# Patient Record
Sex: MALE | Race: BLACK OR AFRICAN AMERICAN | NOT HISPANIC OR LATINO | URBAN - METROPOLITAN AREA
[De-identification: names, ages, dates, MRNs, and addresses within clinical notes are randomized per-mention and may not be internally consistent; named-entity substitution may affect disease eponyms.]

---

## 2018-07-02 ENCOUNTER — INPATIENT (INPATIENT)
Facility: HOSPITAL | Age: 28
LOS: 2 days | Discharge: HOME | End: 2018-07-05
Attending: HOSPITALIST | Admitting: HOSPITALIST
Payer: MEDICAID

## 2018-07-02 VITALS
HEART RATE: 108 BPM | OXYGEN SATURATION: 97 % | DIASTOLIC BLOOD PRESSURE: 93 MMHG | TEMPERATURE: 99 F | RESPIRATION RATE: 16 BRPM | SYSTOLIC BLOOD PRESSURE: 130 MMHG

## 2018-07-02 LAB
ALBUMIN SERPL ELPH-MCNC: 3.7 G/DL — SIGNIFICANT CHANGE UP (ref 3.3–5)
ALBUMIN SERPL ELPH-MCNC: 5.4 G/DL — HIGH (ref 3.3–5)
ALP SERPL-CCNC: 57 U/L — SIGNIFICANT CHANGE UP (ref 40–120)
ALP SERPL-CCNC: 79 U/L — SIGNIFICANT CHANGE UP (ref 40–120)
ALT FLD-CCNC: 11 U/L — SIGNIFICANT CHANGE UP (ref 4–41)
ALT FLD-CCNC: 13 U/L — SIGNIFICANT CHANGE UP (ref 4–41)
APPEARANCE UR: CLEAR — SIGNIFICANT CHANGE UP
AST SERPL-CCNC: 26 U/L — SIGNIFICANT CHANGE UP (ref 4–40)
AST SERPL-CCNC: 27 U/L — SIGNIFICANT CHANGE UP (ref 4–40)
BACTERIA # UR AUTO: SIGNIFICANT CHANGE UP
BASOPHILS # BLD AUTO: 0.03 K/UL — SIGNIFICANT CHANGE UP (ref 0–0.2)
BASOPHILS # BLD AUTO: 0.04 K/UL — SIGNIFICANT CHANGE UP (ref 0–0.2)
BASOPHILS # BLD AUTO: 0.05 K/UL — SIGNIFICANT CHANGE UP (ref 0–0.2)
BASOPHILS NFR BLD AUTO: 0.2 % — SIGNIFICANT CHANGE UP (ref 0–2)
BASOPHILS NFR BLD AUTO: 0.3 % — SIGNIFICANT CHANGE UP (ref 0–2)
BASOPHILS NFR BLD AUTO: 0.3 % — SIGNIFICANT CHANGE UP (ref 0–2)
BILIRUB SERPL-MCNC: 0.6 MG/DL — SIGNIFICANT CHANGE UP (ref 0.2–1.2)
BILIRUB SERPL-MCNC: 1.3 MG/DL — HIGH (ref 0.2–1.2)
BILIRUB UR-MCNC: NEGATIVE — SIGNIFICANT CHANGE UP
BLOOD UR QL VISUAL: NEGATIVE — SIGNIFICANT CHANGE UP
BUN SERPL-MCNC: 28 MG/DL — HIGH (ref 7–23)
BUN SERPL-MCNC: 30 MG/DL — HIGH (ref 7–23)
CALCIUM SERPL-MCNC: 10.2 MG/DL — SIGNIFICANT CHANGE UP (ref 8.4–10.5)
CALCIUM SERPL-MCNC: 8.3 MG/DL — LOW (ref 8.4–10.5)
CHLORIDE SERPL-SCNC: 102 MMOL/L — SIGNIFICANT CHANGE UP (ref 98–107)
CHLORIDE SERPL-SCNC: 92 MMOL/L — LOW (ref 98–107)
CK SERPL-CCNC: 839 U/L — HIGH (ref 30–200)
CK SERPL-CCNC: 919 U/L — HIGH (ref 30–200)
CO2 SERPL-SCNC: 26 MMOL/L — SIGNIFICANT CHANGE UP (ref 22–31)
CO2 SERPL-SCNC: 28 MMOL/L — SIGNIFICANT CHANGE UP (ref 22–31)
COLOR SPEC: SIGNIFICANT CHANGE UP
CREAT SERPL-MCNC: 2.32 MG/DL — HIGH (ref 0.5–1.3)
CREAT SERPL-MCNC: 3.6 MG/DL — HIGH (ref 0.5–1.3)
EOSINOPHIL # BLD AUTO: 0 K/UL — SIGNIFICANT CHANGE UP (ref 0–0.5)
EOSINOPHIL # BLD AUTO: 0.04 K/UL — SIGNIFICANT CHANGE UP (ref 0–0.5)
EOSINOPHIL # BLD AUTO: 0.06 K/UL — SIGNIFICANT CHANGE UP (ref 0–0.5)
EOSINOPHIL NFR BLD AUTO: 0 % — SIGNIFICANT CHANGE UP (ref 0–6)
EOSINOPHIL NFR BLD AUTO: 0.3 % — SIGNIFICANT CHANGE UP (ref 0–6)
EOSINOPHIL NFR BLD AUTO: 0.5 % — SIGNIFICANT CHANGE UP (ref 0–6)
GLUCOSE SERPL-MCNC: 102 MG/DL — HIGH (ref 70–99)
GLUCOSE SERPL-MCNC: 110 MG/DL — HIGH (ref 70–99)
GLUCOSE UR-MCNC: NEGATIVE — SIGNIFICANT CHANGE UP
GRAN CASTS # UR COMP ASSIST: SIGNIFICANT CHANGE UP
HBA1C BLD-MCNC: 5.4 % — SIGNIFICANT CHANGE UP (ref 4–5.6)
HCT VFR BLD CALC: 38.1 % — LOW (ref 39–50)
HCT VFR BLD CALC: 41.5 % — SIGNIFICANT CHANGE UP (ref 39–50)
HCT VFR BLD CALC: 50.7 % — HIGH (ref 39–50)
HGB BLD-MCNC: 12.3 G/DL — LOW (ref 13–17)
HGB BLD-MCNC: 13.3 G/DL — SIGNIFICANT CHANGE UP (ref 13–17)
HGB BLD-MCNC: 16.8 G/DL — SIGNIFICANT CHANGE UP (ref 13–17)
HYALINE CASTS # UR AUTO: SIGNIFICANT CHANGE UP (ref 0–?)
IMM GRANULOCYTES # BLD AUTO: 0.05 # — SIGNIFICANT CHANGE UP
IMM GRANULOCYTES # BLD AUTO: 0.08 # — SIGNIFICANT CHANGE UP
IMM GRANULOCYTES # BLD AUTO: 0.13 # — SIGNIFICANT CHANGE UP
IMM GRANULOCYTES NFR BLD AUTO: 0.4 % — SIGNIFICANT CHANGE UP (ref 0–1.5)
IMM GRANULOCYTES NFR BLD AUTO: 0.6 % — SIGNIFICANT CHANGE UP (ref 0–1.5)
IMM GRANULOCYTES NFR BLD AUTO: 0.7 % — SIGNIFICANT CHANGE UP (ref 0–1.5)
KETONES UR-MCNC: NEGATIVE — SIGNIFICANT CHANGE UP
LEUKOCYTE ESTERASE UR-ACNC: NEGATIVE — SIGNIFICANT CHANGE UP
LIDOCAIN IGE QN: 9.8 U/L — SIGNIFICANT CHANGE UP (ref 7–60)
LYMPHOCYTES # BLD AUTO: 15.4 % — SIGNIFICANT CHANGE UP (ref 13–44)
LYMPHOCYTES # BLD AUTO: 18.9 % — SIGNIFICANT CHANGE UP (ref 13–44)
LYMPHOCYTES # BLD AUTO: 2.65 K/UL — SIGNIFICANT CHANGE UP (ref 1–3.3)
LYMPHOCYTES # BLD AUTO: 2.74 K/UL — SIGNIFICANT CHANGE UP (ref 1–3.3)
LYMPHOCYTES # BLD AUTO: 2.79 K/UL — SIGNIFICANT CHANGE UP (ref 1–3.3)
LYMPHOCYTES # BLD AUTO: 23.6 % — SIGNIFICANT CHANGE UP (ref 13–44)
MCHC RBC-ENTMCNC: 27.3 PG — SIGNIFICANT CHANGE UP (ref 27–34)
MCHC RBC-ENTMCNC: 27.4 PG — SIGNIFICANT CHANGE UP (ref 27–34)
MCHC RBC-ENTMCNC: 27.6 PG — SIGNIFICANT CHANGE UP (ref 27–34)
MCHC RBC-ENTMCNC: 32 % — SIGNIFICANT CHANGE UP (ref 32–36)
MCHC RBC-ENTMCNC: 32.3 % — SIGNIFICANT CHANGE UP (ref 32–36)
MCHC RBC-ENTMCNC: 33.1 % — SIGNIFICANT CHANGE UP (ref 32–36)
MCV RBC AUTO: 82.6 FL — SIGNIFICANT CHANGE UP (ref 80–100)
MCV RBC AUTO: 85 FL — SIGNIFICANT CHANGE UP (ref 80–100)
MCV RBC AUTO: 85.4 FL — SIGNIFICANT CHANGE UP (ref 80–100)
MONOCYTES # BLD AUTO: 1.14 K/UL — HIGH (ref 0–0.9)
MONOCYTES # BLD AUTO: 1.49 K/UL — HIGH (ref 0–0.9)
MONOCYTES # BLD AUTO: 1.53 K/UL — HIGH (ref 0–0.9)
MONOCYTES NFR BLD AUTO: 10.9 % — SIGNIFICANT CHANGE UP (ref 2–14)
MONOCYTES NFR BLD AUTO: 8.3 % — SIGNIFICANT CHANGE UP (ref 2–14)
MONOCYTES NFR BLD AUTO: 9.7 % — SIGNIFICANT CHANGE UP (ref 2–14)
MUCOUS THREADS # UR AUTO: SIGNIFICANT CHANGE UP
NEUTROPHILS # BLD AUTO: 13.44 K/UL — HIGH (ref 1.8–7.4)
NEUTROPHILS # BLD AUTO: 7.72 K/UL — HIGH (ref 1.8–7.4)
NEUTROPHILS # BLD AUTO: 9.67 K/UL — HIGH (ref 1.8–7.4)
NEUTROPHILS NFR BLD AUTO: 65.5 % — SIGNIFICANT CHANGE UP (ref 43–77)
NEUTROPHILS NFR BLD AUTO: 69.1 % — SIGNIFICANT CHANGE UP (ref 43–77)
NEUTROPHILS NFR BLD AUTO: 75.3 % — SIGNIFICANT CHANGE UP (ref 43–77)
NITRITE UR-MCNC: NEGATIVE — SIGNIFICANT CHANGE UP
NON-SQ EPI CELLS # UR AUTO: <1 — SIGNIFICANT CHANGE UP
NRBC # FLD: 0 — SIGNIFICANT CHANGE UP
PH UR: 6 — SIGNIFICANT CHANGE UP (ref 4.6–8)
PLATELET # BLD AUTO: 234 K/UL — SIGNIFICANT CHANGE UP (ref 150–400)
PLATELET # BLD AUTO: 249 K/UL — SIGNIFICANT CHANGE UP (ref 150–400)
PLATELET # BLD AUTO: 347 K/UL — SIGNIFICANT CHANGE UP (ref 150–400)
PMV BLD: 10.4 FL — SIGNIFICANT CHANGE UP (ref 7–13)
PMV BLD: 10.5 FL — SIGNIFICANT CHANGE UP (ref 7–13)
PMV BLD: 10.7 FL — SIGNIFICANT CHANGE UP (ref 7–13)
POTASSIUM SERPL-MCNC: 3.4 MMOL/L — LOW (ref 3.5–5.3)
POTASSIUM SERPL-MCNC: 3.7 MMOL/L — SIGNIFICANT CHANGE UP (ref 3.5–5.3)
POTASSIUM SERPL-SCNC: 3.4 MMOL/L — LOW (ref 3.5–5.3)
POTASSIUM SERPL-SCNC: 3.7 MMOL/L — SIGNIFICANT CHANGE UP (ref 3.5–5.3)
PROT SERPL-MCNC: 6.5 G/DL — SIGNIFICANT CHANGE UP (ref 6–8.3)
PROT SERPL-MCNC: 9.3 G/DL — HIGH (ref 6–8.3)
PROT UR-MCNC: NEGATIVE MG/DL — SIGNIFICANT CHANGE UP
RBC # BLD: 4.46 M/UL — SIGNIFICANT CHANGE UP (ref 4.2–5.8)
RBC # BLD: 4.88 M/UL — SIGNIFICANT CHANGE UP (ref 4.2–5.8)
RBC # BLD: 6.14 M/UL — HIGH (ref 4.2–5.8)
RBC # FLD: 13.2 % — SIGNIFICANT CHANGE UP (ref 10.3–14.5)
RBC # FLD: 13.3 % — SIGNIFICANT CHANGE UP (ref 10.3–14.5)
RBC # FLD: 13.4 % — SIGNIFICANT CHANGE UP (ref 10.3–14.5)
RBC CASTS # UR COMP ASSIST: SIGNIFICANT CHANGE UP (ref 0–?)
SODIUM SERPL-SCNC: 138 MMOL/L — SIGNIFICANT CHANGE UP (ref 135–145)
SODIUM SERPL-SCNC: 139 MMOL/L — SIGNIFICANT CHANGE UP (ref 135–145)
SP GR SPEC: 1.01 — SIGNIFICANT CHANGE UP (ref 1–1.04)
UROBILINOGEN FLD QL: NORMAL MG/DL — SIGNIFICANT CHANGE UP
WBC # BLD: 11.8 K/UL — HIGH (ref 3.8–10.5)
WBC # BLD: 14 K/UL — HIGH (ref 3.8–10.5)
WBC # BLD: 17.85 K/UL — HIGH (ref 3.8–10.5)
WBC # FLD AUTO: 11.8 K/UL — HIGH (ref 3.8–10.5)
WBC # FLD AUTO: 14 K/UL — HIGH (ref 3.8–10.5)
WBC # FLD AUTO: 17.85 K/UL — HIGH (ref 3.8–10.5)
WBC UR QL: SIGNIFICANT CHANGE UP (ref 0–?)

## 2018-07-02 PROCEDURE — 73630 X-RAY EXAM OF FOOT: CPT | Mod: 26,LT

## 2018-07-02 PROCEDURE — 73590 X-RAY EXAM OF LOWER LEG: CPT | Mod: 26,LT

## 2018-07-02 PROCEDURE — 73610 X-RAY EXAM OF ANKLE: CPT | Mod: 26,LT

## 2018-07-02 RX ORDER — MORPHINE SULFATE 50 MG/1
4 CAPSULE, EXTENDED RELEASE ORAL ONCE
Qty: 0 | Refills: 0 | Status: DISCONTINUED | OUTPATIENT
Start: 2018-07-02 | End: 2018-07-02

## 2018-07-02 RX ORDER — DIAZEPAM 5 MG
5 TABLET ORAL ONCE
Qty: 0 | Refills: 0 | Status: DISCONTINUED | OUTPATIENT
Start: 2018-07-02 | End: 2018-07-02

## 2018-07-02 RX ORDER — SODIUM CHLORIDE 9 MG/ML
3000 INJECTION INTRAMUSCULAR; INTRAVENOUS; SUBCUTANEOUS
Qty: 0 | Refills: 0 | Status: DISCONTINUED | OUTPATIENT
Start: 2018-07-02 | End: 2018-07-02

## 2018-07-02 RX ORDER — SODIUM CHLORIDE 9 MG/ML
2000 INJECTION INTRAMUSCULAR; INTRAVENOUS; SUBCUTANEOUS ONCE
Qty: 0 | Refills: 0 | Status: COMPLETED | OUTPATIENT
Start: 2018-07-02 | End: 2018-07-02

## 2018-07-02 RX ORDER — ONDANSETRON 8 MG/1
4 TABLET, FILM COATED ORAL ONCE
Qty: 0 | Refills: 0 | Status: COMPLETED | OUTPATIENT
Start: 2018-07-02 | End: 2018-07-02

## 2018-07-02 RX ORDER — SODIUM CHLORIDE 9 MG/ML
1000 INJECTION INTRAMUSCULAR; INTRAVENOUS; SUBCUTANEOUS ONCE
Qty: 0 | Refills: 0 | Status: COMPLETED | OUTPATIENT
Start: 2018-07-02 | End: 2018-07-02

## 2018-07-02 RX ORDER — SODIUM CHLORIDE 9 MG/ML
1000 INJECTION INTRAMUSCULAR; INTRAVENOUS; SUBCUTANEOUS
Qty: 0 | Refills: 0 | Status: DISCONTINUED | OUTPATIENT
Start: 2018-07-02 | End: 2018-07-04

## 2018-07-02 RX ADMIN — SODIUM CHLORIDE 1000 MILLILITER(S): 9 INJECTION INTRAMUSCULAR; INTRAVENOUS; SUBCUTANEOUS at 13:04

## 2018-07-02 RX ADMIN — MORPHINE SULFATE 4 MILLIGRAM(S): 50 CAPSULE, EXTENDED RELEASE ORAL at 22:25

## 2018-07-02 RX ADMIN — SODIUM CHLORIDE 250 MILLILITER(S): 9 INJECTION INTRAMUSCULAR; INTRAVENOUS; SUBCUTANEOUS at 17:00

## 2018-07-02 RX ADMIN — MORPHINE SULFATE 4 MILLIGRAM(S): 50 CAPSULE, EXTENDED RELEASE ORAL at 21:58

## 2018-07-02 RX ADMIN — MORPHINE SULFATE 4 MILLIGRAM(S): 50 CAPSULE, EXTENDED RELEASE ORAL at 11:06

## 2018-07-02 RX ADMIN — ONDANSETRON 4 MILLIGRAM(S): 8 TABLET, FILM COATED ORAL at 11:06

## 2018-07-02 RX ADMIN — SODIUM CHLORIDE 1000 MILLILITER(S): 9 INJECTION INTRAMUSCULAR; INTRAVENOUS; SUBCUTANEOUS at 11:07

## 2018-07-02 RX ADMIN — Medication 5 MILLIGRAM(S): at 23:27

## 2018-07-02 RX ADMIN — SODIUM CHLORIDE 250 MILLILITER(S): 9 INJECTION INTRAMUSCULAR; INTRAVENOUS; SUBCUTANEOUS at 16:49

## 2018-07-02 NOTE — ED CDU PROVIDER INITIAL DAY NOTE - PROGRESS NOTE DETAILS
Received signout from  and MAIA Matthews: 27yM w/no pmhx presenting with left ankle pain from injury while playing basketball yesterday, he also endorsed muscle spasm and some difficulty with breathing. Pt is in the CDU for dehydration/rhabdo. Repeat labs are pending. If labs show improvement will continue fluids and repeat AM labs. If labs worsening will admit pat and likely consult ortho. Has pt assessed by attg , as pts pain is worsening and CK elevated will admit for rhabdomyolisis. Spoke with hospitalist who recommends ortho consult and repeat labs. Ortho paged. Spoke with orthopedics, compartment syndrome is unlikely given ankle injury. Discussed xray and he agrees it could be possible fracture, pt would need posterior splint and ortho follow up. Spoke with hospitalist who recommends ortho consult and repeat labs. Ortho paged. Discussed with  who agrees with this plan Discussed case with Attg  who evaluated pt at bedside, repeat labs showing increased CK, Cr and BUN downtrending likely dilutional effect. Pt to be admitted for workup of kidney injury.

## 2018-07-02 NOTE — ED CDU PROVIDER INITIAL DAY NOTE - MEDICAL DECISION MAKING DETAILS
8 y/o male with no significant PMHx presents to ED for left ankle pain X 1 day s/p trauma along with elevated Cr and CPK. Concern for rhabdo with ankle strain. Compartments soft, + distal pulses. Sent to OBS for IVF and recheck labs.

## 2018-07-02 NOTE — ED CDU PROVIDER INITIAL DAY NOTE - ATTENDING CONTRIBUTION TO CARE
Pt was seen and evaluated by me. Pt is a 26 y/o male with no significant PMHx presents to ED for left ankle pain X 1 day. Pt states he was playing basketball yesterday and when he came down from a jump and stepped forward and a friend fell onto his leg causing him to roll his ankle. Pt states after having pain to left ankle. Pt denies any fever, chills, nausea, vomiting, SOB, chest pain, or abd pain. Pt notes having pain with ambulation and came into today for evaluation. In the ED pt was found to have some spasms and labs were obtained showing elevated CPK and Cr. Sent to OBS for IVF and recheck labs. Lungs CTA b/l. RRR. Abd soft, non-tender. Noted to have swelling with tenderness to left lateral malleolus. + distal pulses. Compartments soft. No calf swelling. 5/5 b/l LE.

## 2018-07-02 NOTE — ED PROVIDER NOTE - PROGRESS NOTE DETAILS
Pt noted to have acute renal failure and rhabdo- pt looks and feels better thus far.  Will trial him in CDU for fluids and trend labs. Discussed results and plan with patient, agrees with the plan.

## 2018-07-02 NOTE — ED PROVIDER NOTE - MEDICAL DECISION MAKING DETAILS
28 yo male s/p left ankle injury yesterday with associated n/v, muscle cramping;   -r/o left ankle fx- will get xrays, pain meds  -r/o dehydration vs rhabdo vs electrolyte abnormality- check labs incl CK, IVF and reassess

## 2018-07-02 NOTE — ED CDU PROVIDER INITIAL DAY NOTE - OBJECTIVE STATEMENT
26 y/o male with no significant PMHx presents to ED for left ankle pain X 1 day. Pt states he was playing basketball yesterday and when he came down from a jump and stepped forward and a friend fell onto his leg causing him to roll his ankle. Pt states after having pain to left ankle. Pt denies any fever, chills, nausea, vomiting, SOB, chest pain, or abd pain. Pt notes having pain with ambulation and came into today for evaluation. In the ED pt was found to have some spasms and labs were obtained showing elevated CPK and Cr.

## 2018-07-02 NOTE — ED ADULT NURSE NOTE - OBJECTIVE STATEMENT
Pt received a&ox3, c/o cramping pain to entire body since playing basketball yesterday, states he hasn't urinated since, denies chest pain, pt appears to be in NAD, 20 gauge IV placed in right ac, labs drawn and sent.

## 2018-07-02 NOTE — ED PROVIDER NOTE - CARE PLAN
Principal Discharge DX:	Rhabdomyolysis  Secondary Diagnosis:	Renal failure  Secondary Diagnosis:	Ankle sprain

## 2018-07-02 NOTE — ED CDU PROVIDER INITIAL DAY NOTE - LOCATION
ankle Compartments soft, no calf tenderness. Swelling noted to lateral malleolus with tenderness. + distal pulses./ankle

## 2018-07-02 NOTE — ED ADULT NURSE NOTE - CHIEF COMPLAINT QUOTE
Pt was playing basketball yesterday and injured his left ankle. Today presents with increased pain in ankle, lethargy and dehydration.

## 2018-07-02 NOTE — ED PROVIDER NOTE - OBJECTIVE STATEMENT
28yo male c no sig pmhx BIBA for left ankle pain, cramping, weakness, n/v since this AM.  Pt states injured his left ankle yesterday at the gym while playing basketball.  Pt states has been trying to walk on it since, however this AM was getting off the bus and started feeling severe sharp pain shooting above his ankle associated with severe muscle cramping in his leg, abdomen chest. Pt also had episodes of vomiting this AM.  Pt also endorse chest and abdominal soreness. Has not taken any pain meds since injury. Pt denies any fevers, any other joint injuries, head trauma, urinary sx, diarrhea, substance abuse, or having these sx before.

## 2018-07-03 DIAGNOSIS — M62.82 RHABDOMYOLYSIS: ICD-10-CM

## 2018-07-03 DIAGNOSIS — Z29.9 ENCOUNTER FOR PROPHYLACTIC MEASURES, UNSPECIFIED: ICD-10-CM

## 2018-07-03 DIAGNOSIS — N17.9 ACUTE KIDNEY FAILURE, UNSPECIFIED: ICD-10-CM

## 2018-07-03 DIAGNOSIS — D72.829 ELEVATED WHITE BLOOD CELL COUNT, UNSPECIFIED: ICD-10-CM

## 2018-07-03 DIAGNOSIS — S99.912A UNSPECIFIED INJURY OF LEFT ANKLE, INITIAL ENCOUNTER: ICD-10-CM

## 2018-07-03 DIAGNOSIS — R55 SYNCOPE AND COLLAPSE: ICD-10-CM

## 2018-07-03 DIAGNOSIS — N19 UNSPECIFIED KIDNEY FAILURE: ICD-10-CM

## 2018-07-03 DIAGNOSIS — R25.2 CRAMP AND SPASM: ICD-10-CM

## 2018-07-03 DIAGNOSIS — S96.912A STRAIN OF UNSPECIFIED MUSCLE AND TENDON AT ANKLE AND FOOT LEVEL, LEFT FOOT, INITIAL ENCOUNTER: ICD-10-CM

## 2018-07-03 LAB
ALBUMIN SERPL ELPH-MCNC: 3.2 G/DL — LOW (ref 3.3–5)
ALBUMIN SERPL ELPH-MCNC: 3.4 G/DL — SIGNIFICANT CHANGE UP (ref 3.3–5)
ALP SERPL-CCNC: 49 U/L — SIGNIFICANT CHANGE UP (ref 40–120)
ALP SERPL-CCNC: 55 U/L — SIGNIFICANT CHANGE UP (ref 40–120)
ALT FLD-CCNC: 11 U/L — SIGNIFICANT CHANGE UP (ref 4–41)
ALT FLD-CCNC: 9 U/L — SIGNIFICANT CHANGE UP (ref 4–41)
AST SERPL-CCNC: 28 U/L — SIGNIFICANT CHANGE UP (ref 4–40)
AST SERPL-CCNC: 29 U/L — SIGNIFICANT CHANGE UP (ref 4–40)
BILIRUB SERPL-MCNC: 0.4 MG/DL — SIGNIFICANT CHANGE UP (ref 0.2–1.2)
BILIRUB SERPL-MCNC: 0.5 MG/DL — SIGNIFICANT CHANGE UP (ref 0.2–1.2)
BUN SERPL-MCNC: 18 MG/DL — SIGNIFICANT CHANGE UP (ref 7–23)
BUN SERPL-MCNC: 24 MG/DL — HIGH (ref 7–23)
CALCIUM SERPL-MCNC: 8 MG/DL — LOW (ref 8.4–10.5)
CALCIUM SERPL-MCNC: 8.2 MG/DL — LOW (ref 8.4–10.5)
CHLORIDE SERPL-SCNC: 103 MMOL/L — SIGNIFICANT CHANGE UP (ref 98–107)
CHLORIDE SERPL-SCNC: 109 MMOL/L — HIGH (ref 98–107)
CK SERPL-CCNC: 1024 U/L — HIGH (ref 30–200)
CK SERPL-CCNC: 874 U/L — HIGH (ref 30–200)
CO2 SERPL-SCNC: 25 MMOL/L — SIGNIFICANT CHANGE UP (ref 22–31)
CO2 SERPL-SCNC: 27 MMOL/L — SIGNIFICANT CHANGE UP (ref 22–31)
CREAT SERPL-MCNC: 1.42 MG/DL — HIGH (ref 0.5–1.3)
CREAT SERPL-MCNC: 1.87 MG/DL — HIGH (ref 0.5–1.3)
GLUCOSE SERPL-MCNC: 109 MG/DL — HIGH (ref 70–99)
GLUCOSE SERPL-MCNC: 116 MG/DL — HIGH (ref 70–99)
HCT VFR BLD CALC: 36.6 % — LOW (ref 39–50)
HGB BLD-MCNC: 11.6 G/DL — LOW (ref 13–17)
LACTATE SERPL-SCNC: 1.2 MMOL/L — SIGNIFICANT CHANGE UP (ref 0.5–2)
MAGNESIUM SERPL-MCNC: 2 MG/DL — SIGNIFICANT CHANGE UP (ref 1.6–2.6)
MCHC RBC-ENTMCNC: 27.8 PG — SIGNIFICANT CHANGE UP (ref 27–34)
MCHC RBC-ENTMCNC: 31.7 % — LOW (ref 32–36)
MCV RBC AUTO: 87.6 FL — SIGNIFICANT CHANGE UP (ref 80–100)
NRBC # FLD: 0 — SIGNIFICANT CHANGE UP
PHOSPHATE SERPL-MCNC: 2.7 MG/DL — SIGNIFICANT CHANGE UP (ref 2.5–4.5)
PLATELET # BLD AUTO: 207 K/UL — SIGNIFICANT CHANGE UP (ref 150–400)
PMV BLD: 10.8 FL — SIGNIFICANT CHANGE UP (ref 7–13)
POTASSIUM SERPL-MCNC: 3.5 MMOL/L — SIGNIFICANT CHANGE UP (ref 3.5–5.3)
POTASSIUM SERPL-MCNC: 4.4 MMOL/L — SIGNIFICANT CHANGE UP (ref 3.5–5.3)
POTASSIUM SERPL-SCNC: 3.5 MMOL/L — SIGNIFICANT CHANGE UP (ref 3.5–5.3)
POTASSIUM SERPL-SCNC: 4.4 MMOL/L — SIGNIFICANT CHANGE UP (ref 3.5–5.3)
PROT SERPL-MCNC: 5.8 G/DL — LOW (ref 6–8.3)
PROT SERPL-MCNC: 6.2 G/DL — SIGNIFICANT CHANGE UP (ref 6–8.3)
RBC # BLD: 4.18 M/UL — LOW (ref 4.2–5.8)
RBC # FLD: 13.6 % — SIGNIFICANT CHANGE UP (ref 10.3–14.5)
SODIUM SERPL-SCNC: 139 MMOL/L — SIGNIFICANT CHANGE UP (ref 135–145)
SODIUM SERPL-SCNC: 142 MMOL/L — SIGNIFICANT CHANGE UP (ref 135–145)
TROPONIN T, HIGH SENSITIVITY: 11 NG/L — SIGNIFICANT CHANGE UP (ref ?–14)
TROPONIN T, HIGH SENSITIVITY: 16 NG/L — SIGNIFICANT CHANGE UP (ref ?–14)
TSH SERPL-MCNC: 1.39 UIU/ML — SIGNIFICANT CHANGE UP (ref 0.27–4.2)
WBC # BLD: 9.36 K/UL — SIGNIFICANT CHANGE UP (ref 3.8–10.5)
WBC # FLD AUTO: 9.36 K/UL — SIGNIFICANT CHANGE UP (ref 3.8–10.5)

## 2018-07-03 PROCEDURE — 12345: CPT | Mod: NC

## 2018-07-03 PROCEDURE — 70450 CT HEAD/BRAIN W/O DYE: CPT | Mod: 26

## 2018-07-03 PROCEDURE — 99223 1ST HOSP IP/OBS HIGH 75: CPT

## 2018-07-03 RX ORDER — HYDROMORPHONE HYDROCHLORIDE 2 MG/ML
0.5 INJECTION INTRAMUSCULAR; INTRAVENOUS; SUBCUTANEOUS EVERY 6 HOURS
Qty: 0 | Refills: 0 | Status: DISCONTINUED | OUTPATIENT
Start: 2018-07-03 | End: 2018-07-04

## 2018-07-03 RX ORDER — ACETAMINOPHEN 500 MG
650 TABLET ORAL EVERY 6 HOURS
Qty: 0 | Refills: 0 | Status: DISCONTINUED | OUTPATIENT
Start: 2018-07-03 | End: 2018-07-05

## 2018-07-03 RX ORDER — HYDROMORPHONE HYDROCHLORIDE 2 MG/ML
2 INJECTION INTRAMUSCULAR; INTRAVENOUS; SUBCUTANEOUS EVERY 6 HOURS
Qty: 0 | Refills: 0 | Status: DISCONTINUED | OUTPATIENT
Start: 2018-07-03 | End: 2018-07-05

## 2018-07-03 RX ADMIN — HYDROMORPHONE HYDROCHLORIDE 0.5 MILLIGRAM(S): 2 INJECTION INTRAMUSCULAR; INTRAVENOUS; SUBCUTANEOUS at 17:24

## 2018-07-03 RX ADMIN — SODIUM CHLORIDE 125 MILLILITER(S): 9 INJECTION INTRAMUSCULAR; INTRAVENOUS; SUBCUTANEOUS at 12:17

## 2018-07-03 RX ADMIN — HYDROMORPHONE HYDROCHLORIDE 0.5 MILLIGRAM(S): 2 INJECTION INTRAMUSCULAR; INTRAVENOUS; SUBCUTANEOUS at 07:15

## 2018-07-03 RX ADMIN — HYDROMORPHONE HYDROCHLORIDE 0.5 MILLIGRAM(S): 2 INJECTION INTRAMUSCULAR; INTRAVENOUS; SUBCUTANEOUS at 17:39

## 2018-07-03 RX ADMIN — HYDROMORPHONE HYDROCHLORIDE 2 MILLIGRAM(S): 2 INJECTION INTRAMUSCULAR; INTRAVENOUS; SUBCUTANEOUS at 12:17

## 2018-07-03 RX ADMIN — HYDROMORPHONE HYDROCHLORIDE 2 MILLIGRAM(S): 2 INJECTION INTRAMUSCULAR; INTRAVENOUS; SUBCUTANEOUS at 13:00

## 2018-07-03 RX ADMIN — SODIUM CHLORIDE 250 MILLILITER(S): 9 INJECTION INTRAMUSCULAR; INTRAVENOUS; SUBCUTANEOUS at 06:54

## 2018-07-03 RX ADMIN — SODIUM CHLORIDE 250 MILLILITER(S): 9 INJECTION INTRAMUSCULAR; INTRAVENOUS; SUBCUTANEOUS at 01:18

## 2018-07-03 RX ADMIN — HYDROMORPHONE HYDROCHLORIDE 0.5 MILLIGRAM(S): 2 INJECTION INTRAMUSCULAR; INTRAVENOUS; SUBCUTANEOUS at 06:54

## 2018-07-03 NOTE — H&P ADULT - NSHPLABSRESULTS_GEN_ALL_CORE
139  |  103  |  24<H>  ----------------------------<  116<H>  3.5   |  25  |  1.87<H>    Ca    8.2<L>      2018 23:15    TPro  6.2  /  Alb  3.4  /  TBili  0.5  /  DBili  x   /  AST  29  /  ALT  11  /  AlkPhos  55                          12.3   11.80 )-----------( 234      ( 2018 23:15 )             38.1     CARDIAC MARKERS ( 2018 23:15 )  x     / x     / 1024 u/L / x     / x      CARDIAC MARKERS ( 2018 18:20 )  x     / x     / 919 u/L / x     / x      CARDIAC MARKERS ( 2018 11:05 )  x     / x     / 839 u/L / x     / x        LIVER FUNCTIONS - ( 2018 23:15 )  Alb: 3.4 g/dL / Pro: 6.2 g/dL / ALK PHOS: 55 u/L / ALT: 11 u/L / AST: 29 u/L / GGT: x           Urinalysis Basic - ( 2018 18:31 )  Color: PLYEL / Appearance: CLEAR / S.011 / pH: 6.0  Gluc: NEGATIVE / Ketone: NEGATIVE  / Bili: NEGATIVE / Urobili: NORMAL mg/dL   Blood: NEGATIVE / Protein: NEGATIVE mg/dL / Nitrite: NEGATIVE   Leuk Esterase: NEGATIVE / RBC: 0-2 / WBC 2-5   Sq Epi: x / Non Sq Epi: x / Bacteria: FEW    < from: Xray Foot AP + Lateral + Oblique, Left (18 @ 12:02) > / < from: Xray Tibia + Fibula 2 Views, Left (18 @ 12:01) >/ < from: Xray Ankle Complete 3 Views, Left (18 @ 12:01) >  There is severe soft tissue swelling over the lateral malleolus but no definite evidence of fracture. There is a well-corticated bony density   immediately beneath the lateral malleolus likely represents the os subfibularis rather than fracture. The ankle mortise is intact. More proximal tibial and fibular as well as the foot shows no acute pathology. Sclerotic density in the tibia likely represents a benign bone island.  COMPARISON:  None available  IMPRESSION:  Soft tissue injury left ankle.  < end of copied text >     139  |  103  |  24<H>  ----------------------------<  116<H>  3.5   |  25  |  1.87<H>    Ca    8.2<L>      2018 23:15    TPro  6.2  /  Alb  3.4  /  TBili  0.5  /  DBili  x   /  AST  29  /  ALT  11  /  AlkPhos  55                          12.3   11.80 )-----------( 234      ( 2018 23:15 )             38.1     CARDIAC MARKERS ( 2018 23:15 )  x     / x     / 1024 u/L / x     / x      CARDIAC MARKERS ( 2018 18:20 )  x     / x     / 919 u/L / x     / x      CARDIAC MARKERS ( 2018 11:05 )  x     / x     / 839 u/L / x     / x        LIVER FUNCTIONS - ( 2018 23:15 )  Alb: 3.4 g/dL / Pro: 6.2 g/dL / ALK PHOS: 55 u/L / ALT: 11 u/L / AST: 29 u/L / GGT: x           Urinalysis Basic - ( 2018 18:31 )  Color: PLYEL / Appearance: CLEAR / S.011 / pH: 6.0  Gluc: NEGATIVE / Ketone: NEGATIVE  / Bili: NEGATIVE / Urobili: NORMAL mg/dL   Blood: NEGATIVE / Protein: NEGATIVE mg/dL / Nitrite: NEGATIVE   Leuk Esterase: NEGATIVE / RBC: 0-2 / WBC 2-5   Sq Epi: x / Non Sq Epi: x / Bacteria: FEW    < from: Xray Foot AP + Lateral + Oblique, Left (18 @ 12:02) > / < from: Xray Tibia + Fibula 2 Views, Left (18 @ 12:01) >/ < from: Xray Ankle Complete 3 Views, Left (18 @ 12:01) >  There is severe soft tissue swelling over the lateral malleolus but no definite evidence of fracture. There is a well-corticated bony density   immediately beneath the lateral malleolus likely represents the os subfibularis rather than fracture. The ankle mortise is intact. More proximal tibial and fibular as well as the foot shows no acute pathology. Sclerotic density in the tibia likely represents a benign bone island.  COMPARISON:  None available  IMPRESSION:  Soft tissue injury left ankle.  < end of copied text >    EKG personally reviewed - 85bpm NSR, TWI V1, ?J point elevations/early repol, QTc 385ms

## 2018-07-03 NOTE — H&P ADULT - HISTORY OF PRESENT ILLNESS
27-year-old male with no known past medical history, presenting from home with severe L foot pain after a fall while playing basketball on 7/01.  He reports hearing a "pop" sound when he fell, landing on the lateral part of the L ankle and then his ankle buckled underneath him.  Patient reports trying to walk home after he fell, kept having intermittent blurry vision, chills, diaphoresis, dyspnea and a chest pain described as a cramping/heartburn pain in the center of his chest that radiated to the left.  He describes intermittent tonic contractures in legs and arms (describes hands as becoming clawed, where he had to try to pry his fingers apart), also in abdomen and chest.  He states he "blacked out" for a few minutes 2x (was already sitting/laying down, no head trauma).  He had intermittent nausea with non-bloody vomitus x2.  On 07/02 AM patient had another syncopal episode, witnessed, without head trauma, prior to EMS transport to hospital.  Patient states he has never had symptoms like this before.  He reports playing basketball indoors, was drinking fluids, no prolonged exposure to heat.  No recent vigorous cardio or weight lifting, routinely works out at a gym, denies supplement use.  Denies drug use.    Incidentally, patient reports when he was ~13years old he slipped and fell down a flight of stairs leading to a 3d coma w/hospitalization for "blood clot" in his brain, that was never operated on.      In the ED VS: 98.3    125-154/60-75  16-18  96-99%RA, received 6L IVF, Diazepam 5mg PO x1, morphine 4mg IV x2, ondansetron 4mg IV x1

## 2018-07-03 NOTE — H&P ADULT - PROBLEM SELECTOR PLAN 6
likely reactive, monitor/trend, decreasing with IVF likely reactive/from hemoconcentration, monitor/trend, decreasing with IVF

## 2018-07-03 NOTE — CHART NOTE - NSCHARTNOTEFT_GEN_A_CORE
pt seen and examined  reports foot swells with standing  dizziness improved  renal sono ordered  trend CPK  Cr improving  no hx of CKD  ortho eval

## 2018-07-03 NOTE — H&P ADULT - NSHPPHYSICALEXAM_GEN_ALL_CORE
PHYSICAL EXAM:  GENERAL: NAD, well-developed, well-nourished  HEAD:  Atraumatic, Normocephalic  EYES: EOMI, PERRLA, conjunctiva and sclera clear  NECK: Supple, No JVD  CHEST/LUNG: Clear to auscultation bilaterally; No wheezes, rales or rhonchi  HEART: Regular rate and rhythm; No murmurs, rubs, or gallops, (+)S1, S2  ABDOMEN: Soft, Nontender, Nondistended; Normal Bowel sounds   EXTREMITIES:  2+ PT/DP/radial pulses, No clubbing, cyanosis; pitting edema of L ankle with TTP/movement at ankle  PSYCH: normal mood and affect, no SI  NEUROLOGY: AAOx3, decreased sensation to touch over 3/4/5th digits of L foot  SKIN: No rashes; 1cm lesion on R abdomen periumbilical with small ulcerations around periphery, no drainage or surrounding erythema, patient reports it was pruritic and had a ?scab overlying the region that was picked off

## 2018-07-03 NOTE — H&P ADULT - PROBLEM SELECTOR PLAN 5
ortho consult placed, f/u recs  pain regimen: hydromorphone IV/PO/acetaminophen PRN severe/mod/mild pain ortho consult placed given decreased sensation over 3/4/5th digits, pulses intact, f/u recs  pain regimen: hydromorphone IV/PO/acetaminophen PRN severe/mod/mild pain

## 2018-07-03 NOTE — H&P ADULT - PROBLEM SELECTOR PLAN 2
improving with large volume IVF resuscitation  renally dose meds, avoid nephrotoxins, trend Cr pre-renal, improving with large volume IVF resuscitation  renally dose meds, avoid nephrotoxins, trend Cr, monitor fluid status fall precautions, check orthostatics  unclear etiology - pain from ankle injury vs heat stroke vs neuro event, unlikely cardiac, however given report of chest pain/?cramping added trop onto labs, will monitor on tele for now

## 2018-07-03 NOTE — H&P ADULT - PROBLEM SELECTOR PLAN 4
of unclear etiology  checking Mg/Phos, will check TSH, other electrolytes wnl  CTH given remote history of head trauma, eval for structural abnormality of unclear etiology  checking Mg/Phos, will check TSH, other electrolytes wnl  CTH given remote history of head trauma, eval for structural abnormality ?possible partial tonic seizure?

## 2018-07-03 NOTE — H&P ADULT - PROBLEM SELECTOR PLAN 1
mild rhabdo in setting of recent trauma  trend CK  EKG pending pre-renal, improving with large volume IVF resuscitation  renally dose meds, avoid nephrotoxins, trend Cr, monitor fluid status

## 2018-07-03 NOTE — ED CDU PROVIDER DISPOSITION NOTE - CLINICAL COURSE
pt initially admitted to the CDU for rhabdo - the initial Cr was elevated with a ratio of close to ten.  Despite 3 L of NS the CK remained stable and elevated a little while the Cr dropped but ratio remained abnormal.  Compartments remained soft.  The patient warrants further admission and eval for the Cr primarily and secondarily to further monitor the progression of the CK

## 2018-07-03 NOTE — H&P ADULT - PROBLEM SELECTOR PLAN 3
fall precautions   unclear etiology - pain from ankle injury vs heat stroke vs neuro event, unlikely cardiac, however given report of chest ?cramping added trop onto labs fall precautions, check orthostatics  unclear etiology - pain from ankle injury vs heat stroke vs neuro event, unlikely cardiac, however given report of chest pain/?cramping added trop onto labs, will monitor on tele for now mild rhabdo in setting of recent trauma  trend CK  EKG pending

## 2018-07-03 NOTE — H&P ADULT - NSHPSOCIALHISTORY_GEN_ALL_CORE
Single, lives with grandfather who has dementia and 2 uncles  Has 3 healthy children that live with their mothers   Never smoker  Prior marijuana use, none for quite some time; no other illicit drug use specifically no MDMA use

## 2018-07-03 NOTE — CONSULT NOTE ADULT - SUBJECTIVE AND OBJECTIVE BOX
Patient is a 27 year old male who presented to the ER after a fall 2 days ago while playing basketball. He reports hearing a "pop" sound when he fell and  landed on the L ankle which he states buckled underneath him.  Patient states while walking home from fall, He felt intermittent chest pain, dizziness. Had a syncopal fall without head trauma on 7/2/18. Patient admitted to Medical Team for further cardiac workup. Orthopaedics consulted Patient is a 27 year old male who presented to the ER after a fall 2 days ago while playing basketball. He reports hearing a "pop" sound when he fell and  landed on the L ankle which he states buckled underneath him.  Patient states while walking home from fall, He felt intermittent chest pain, dizziness. Had a syncopal fall without head trauma on 7/2/18. Patient admitted to Medical Team for further cardiac workup. Orthopaedics consulted for evaluation of L ankle. Phmx: Rhabdomylysis, Syncope, LATOYA.    T(C): 36.8 (07-03-18 @ 09:45), Max: 36.9 (07-02-18 @ 13:03)  HR: 81 (07-03-18 @ 09:45) (81 - 107)  BP: 143/85 (07-03-18 @ 09:45) (125/75 - 154/68)  RR: 18 (07-03-18 @ 09:45) (16 - 18)  SpO2: 98% (07-03-18 @ 09:45) (96% - 100%)  Wt(kg): --                        11.6   9.36  )-----------( 207      ( 03 Jul 2018 07:45 )             36.6     07-03    142  |  109<H>  |  18  ----------------------------<  109<H>  4.4   |  27  |  1.42<H>    Ca    8.0<L>      03 Jul 2018 07:45  Phos  2.7     07-03  Mg     2.0     07-03    TPro  5.8<L>  /  Alb  3.2<L>  /  TBili  0.4  /  DBili  x   /  AST  28  /  ALT  9   /  AlkPhos  49  07-03    EXAM:  NAD, Alert and Oriented   LLE: Diffuse swelling throughout left ankle. Tenderness to palpation over medial and lateral malleolus, lateral greater than medial. Able to move toes with pain. Patient refusing motor exam due to pain. Intermittent numbness/tingling. 2+ pulses. Extremities are warm. compartments are soft.     A/P: This is a 27y Male who presents today with Left ankle sprain  - WBAT in Aircast  - PT/OT  - Pain control / analgesia  - Rest, ice, elevation  - Please follow up with Dr Gonzalez in his office in 1 week. Call office to make an appointment. [phone]  - Will notify attending and will continue to update  - Call ortho with questions X [phone]

## 2018-07-03 NOTE — H&P ADULT - ASSESSMENT
27-year-old male with no known past medical history, presenting from home with severe L foot pain after a fall while playing basketball on 7/01, fd to have a soft tissue injury of L ankle, LATOYA improving on IVF, and increasing CK of unclear etiology, history of syncopal episodes prior to presenting to ED may be secondary to pain vs heat stroke vs seizures

## 2018-07-03 NOTE — H&P ADULT - NSHPREVIEWOFSYSTEMS_GEN_ALL_CORE
REVIEW OF SYSTEMS:    CONSTITUTIONAL: No weakness, fevers, (+) chills  EYES/ENT: (+) intermittent blurry vision, none presently;  No dysphagia  NECK: No pain or stiffness  RESPIRATORY: No cough; No shortness of breath  CARDIOVASCULAR: No chest pain or palpitations presently; L ankle edema since starting on IVF  GASTROINTESTINAL: No abdominal or epigastric pain.  N/V as above; No diarrhea or constipation. No melena or hematochezia.  GENITOURINARY: No dysuria, frequency or hematuria  NEUROLOGICAL: Reports developing decreased sensation in 3/4/5th digits of L foot; no dizziness, no HA  SKIN: No burning, rashes, abdomen circular 1cm lesion on abdomen antonieta-umbilical, without drainage patient attributes to ingrown hair, denies any other lesions   All other review of systems is negative unless indicated above.

## 2018-07-04 LAB
ALBUMIN SERPL ELPH-MCNC: 3.3 G/DL — SIGNIFICANT CHANGE UP (ref 3.3–5)
ALP SERPL-CCNC: 53 U/L — SIGNIFICANT CHANGE UP (ref 40–120)
ALT FLD-CCNC: 24 U/L — SIGNIFICANT CHANGE UP (ref 4–41)
AST SERPL-CCNC: 33 U/L — SIGNIFICANT CHANGE UP (ref 4–40)
BACTERIA UR CULT: SIGNIFICANT CHANGE UP
BILIRUB SERPL-MCNC: 0.3 MG/DL — SIGNIFICANT CHANGE UP (ref 0.2–1.2)
BUN SERPL-MCNC: 7 MG/DL — SIGNIFICANT CHANGE UP (ref 7–23)
CALCIUM SERPL-MCNC: 8.4 MG/DL — SIGNIFICANT CHANGE UP (ref 8.4–10.5)
CHLORIDE SERPL-SCNC: 109 MMOL/L — HIGH (ref 98–107)
CK MB BLD-MCNC: 1.15 NG/ML — SIGNIFICANT CHANGE UP (ref 1–6.6)
CK SERPL-CCNC: 570 U/L — HIGH (ref 30–200)
CO2 SERPL-SCNC: 28 MMOL/L — SIGNIFICANT CHANGE UP (ref 22–31)
CREAT SERPL-MCNC: 1.09 MG/DL — SIGNIFICANT CHANGE UP (ref 0.5–1.3)
D DIMER BLD IA.RAPID-MCNC: 198 NG/ML — SIGNIFICANT CHANGE UP
GLUCOSE SERPL-MCNC: 90 MG/DL — SIGNIFICANT CHANGE UP (ref 70–99)
HCT VFR BLD CALC: 37.4 % — LOW (ref 39–50)
HGB BLD-MCNC: 12 G/DL — LOW (ref 13–17)
MAGNESIUM SERPL-MCNC: 1.9 MG/DL — SIGNIFICANT CHANGE UP (ref 1.6–2.6)
MCHC RBC-ENTMCNC: 28 PG — SIGNIFICANT CHANGE UP (ref 27–34)
MCHC RBC-ENTMCNC: 32.1 % — SIGNIFICANT CHANGE UP (ref 32–36)
MCV RBC AUTO: 87.2 FL — SIGNIFICANT CHANGE UP (ref 80–100)
NRBC # FLD: 0 — SIGNIFICANT CHANGE UP
PHOSPHATE SERPL-MCNC: 3 MG/DL — SIGNIFICANT CHANGE UP (ref 2.5–4.5)
PLATELET # BLD AUTO: 205 K/UL — SIGNIFICANT CHANGE UP (ref 150–400)
PMV BLD: 11 FL — SIGNIFICANT CHANGE UP (ref 7–13)
POTASSIUM SERPL-MCNC: 3.8 MMOL/L — SIGNIFICANT CHANGE UP (ref 3.5–5.3)
POTASSIUM SERPL-SCNC: 3.8 MMOL/L — SIGNIFICANT CHANGE UP (ref 3.5–5.3)
PROT SERPL-MCNC: 6.1 G/DL — SIGNIFICANT CHANGE UP (ref 6–8.3)
RBC # BLD: 4.29 M/UL — SIGNIFICANT CHANGE UP (ref 4.2–5.8)
RBC # FLD: 13.2 % — SIGNIFICANT CHANGE UP (ref 10.3–14.5)
SODIUM SERPL-SCNC: 145 MMOL/L — SIGNIFICANT CHANGE UP (ref 135–145)
SPECIMEN SOURCE: SIGNIFICANT CHANGE UP
TROPONIN T, HIGH SENSITIVITY: 8 NG/L — SIGNIFICANT CHANGE UP (ref ?–14)
WBC # BLD: 7 K/UL — SIGNIFICANT CHANGE UP (ref 3.8–10.5)
WBC # FLD AUTO: 7 K/UL — SIGNIFICANT CHANGE UP (ref 3.8–10.5)

## 2018-07-04 PROCEDURE — 99233 SBSQ HOSP IP/OBS HIGH 50: CPT

## 2018-07-04 PROCEDURE — 93010 ELECTROCARDIOGRAM REPORT: CPT

## 2018-07-04 PROCEDURE — 76770 US EXAM ABDO BACK WALL COMP: CPT | Mod: 26

## 2018-07-04 RX ORDER — IPRATROPIUM/ALBUTEROL SULFATE 18-103MCG
3 AEROSOL WITH ADAPTER (GRAM) INHALATION ONCE
Qty: 0 | Refills: 0 | Status: COMPLETED | OUTPATIENT
Start: 2018-07-04 | End: 2018-07-04

## 2018-07-04 RX ORDER — HYDROMORPHONE HYDROCHLORIDE 2 MG/ML
4 INJECTION INTRAMUSCULAR; INTRAVENOUS; SUBCUTANEOUS EVERY 6 HOURS
Qty: 0 | Refills: 0 | Status: DISCONTINUED | OUTPATIENT
Start: 2018-07-04 | End: 2018-07-05

## 2018-07-04 RX ADMIN — HYDROMORPHONE HYDROCHLORIDE 0.5 MILLIGRAM(S): 2 INJECTION INTRAMUSCULAR; INTRAVENOUS; SUBCUTANEOUS at 06:01

## 2018-07-04 RX ADMIN — HYDROMORPHONE HYDROCHLORIDE 0.5 MILLIGRAM(S): 2 INJECTION INTRAMUSCULAR; INTRAVENOUS; SUBCUTANEOUS at 10:30

## 2018-07-04 RX ADMIN — HYDROMORPHONE HYDROCHLORIDE 0.5 MILLIGRAM(S): 2 INJECTION INTRAMUSCULAR; INTRAVENOUS; SUBCUTANEOUS at 06:16

## 2018-07-04 RX ADMIN — Medication 3 MILLILITER(S): at 06:00

## 2018-07-04 RX ADMIN — HYDROMORPHONE HYDROCHLORIDE 0.5 MILLIGRAM(S): 2 INJECTION INTRAMUSCULAR; INTRAVENOUS; SUBCUTANEOUS at 10:45

## 2018-07-04 NOTE — PHYSICAL THERAPY INITIAL EVALUATION ADULT - ASR WT BEARING STATUS EVAL
as per PT and Activity Orders; However, patient is ambulating left Lower Extremity NWB, as per choice./no weight-bearing restrictions

## 2018-07-04 NOTE — OCCUPATIONAL THERAPY INITIAL EVALUATION ADULT - ASR WT BEARING STATUS EVAL
Per Ortho PA note by Rakel Davis on 7/3/18 12:15, pt. is "WBAT in Aircast." Noted pt. with no Aircast to Left LE. RIGOBERTO Paige made aware and acknowledged. Per OT discretion, pt maintained Left LE non-weight bearing for this evaluation. RIGOBERTO Paige stated she was going to follow up with ortho team.

## 2018-07-04 NOTE — OCCUPATIONAL THERAPY INITIAL EVALUATION ADULT - MD ORDER
Occupational Therapy (OT) to evaluate and treat. Occupational Therapy (OT) to evaluate and treat. Per RIGOBERTO HYDE, pt is okay to participate in OT evaluation and perform activity as tolerated.

## 2018-07-04 NOTE — OCCUPATIONAL THERAPY INITIAL EVALUATION ADULT - PERTINENT HX OF CURRENT PROBLEM, REHAB EVAL
Pt is a 27 year old male with no past medical history who on 7/1/18 had a fall while playing basketball with severe Left foot pain. Pt reports, in walking home- intermittent blurry vision, chills, diaphoresis, dyspnea, chest pain, intermittent tonic contractures in legs and arms, nausea & vomiting, and “blacking out.” On 7/2/18, pt reports another syncopal episode and so came to [facility]. (See continued below)

## 2018-07-04 NOTE — OCCUPATIONAL THERAPY INITIAL EVALUATION ADULT - GENERAL OBSERVATIONS, REHAB EVAL
Pt. received semisupine in bed. No acute distress. Patient agreed to evaluation from Occupational Therapist. +Ace Wrap to Left ankle, +Heplock, +Tele.

## 2018-07-04 NOTE — PROGRESS NOTE ADULT - PROBLEM SELECTOR PLAN 5
assessed by ortho as left ankle sprain  - WBAT in Aircast  - PT/OT  - Pain control with hydromorphone IV/PO/acetaminophen PRN severe/mod/mild pain  - Rest, ice, elevation  - Please follow up with Dr Gonzalez in his office in 1 week. Call office to make an appointment. [phone]

## 2018-07-04 NOTE — PROGRESS NOTE ADULT - SUBJECTIVE AND OBJECTIVE BOX
Patient is a 27y old  Male who presents with a chief complaint of     SUBJECTIVE / OVERNIGHT EVENTS:    MEDICATIONS  (STANDING):  sodium chloride 0.9%. 1000 milliLiter(s) (125 mL/Hr) IV Continuous <Continuous>    MEDICATIONS  (PRN):  acetaminophen   Tablet. 650 milliGRAM(s) Oral every 6 hours PRN Mild Pain (1 - 3)  HYDROmorphone   Tablet 2 milliGRAM(s) Oral every 6 hours PRN Moderate Pain (4 - 6)  HYDROmorphone  Injectable 0.5 milliGRAM(s) IV Push every 6 hours PRN Severe Pain (7 - 10)  LORazepam   Injectable 2 milliGRAM(s) IV Push every 4 hours PRN seizure like activity      Vital Signs Last 24 Hrs  T(C): 36.6 (2018 05:26), Max: 37.3 (2018 14:24)  T(F): 97.9 (2018 05:26), Max: 99.2 (2018 14:24)  HR: 94 (2018 05:26) (75 - 94)  BP: 137/70 (2018 05:26) (133/61 - 147/89)  BP(mean): --  RR: 17 (2018 05:26) (16 - 18)  SpO2: 99% (2018 05:26) (97% - 99%)  CAPILLARY BLOOD GLUCOSE        I&O's Summary    2018 07:01  -  2018 07:00  --------------------------------------------------------  IN: 2375 mL / OUT: 2750 mL / NET: -375 mL    2018 07:01  -  2018 08:47  --------------------------------------------------------  IN: 125 mL / OUT: 0 mL / NET: 125 mL        PHYSICAL EXAM:  GENERAL: NAD, well-developed  HEAD:  Atraumatic, Normocephalic  EYES: EOMI, PERRLA, conjunctiva and sclera clear  NECK: Supple, No JVD  CHEST/LUNG: Clear to auscultation bilaterally; No wheeze  HEART: Regular rate and rhythm; No murmurs, rubs, or gallops  ABDOMEN: Soft, Nontender, Nondistended; Bowel sounds present  EXTREMITIES:  2+ Peripheral Pulses, No clubbing, cyanosis, or edema  PSYCH: AAOx3  NEUROLOGY: non-focal  SKIN: No rashes or lesions    LABS:                        12.0   7.00  )-----------( 205      ( 2018 07:28 )             37.4     07-04    145  |  109<H>  |  7   ----------------------------<  90  3.8   |  28  |  1.09    Ca    8.4      2018 07:42  Phos  3.0     07-04  Mg     1.9     07-04    TPro  6.1  /  Alb  3.3  /  TBili  0.3  /  DBili  x   /  AST  33  /  ALT  24  /  AlkPhos  53  07-04      CARDIAC MARKERS ( 2018 07:42 )  x     / x     / 570 u/L / x     / x      CARDIAC MARKERS ( 2018 07:45 )  x     / x     / 874 u/L / x     / x      CARDIAC MARKERS ( 2018 23:15 )  x     / x     / 1024 u/L / x     / x      CARDIAC MARKERS ( 2018 18:20 )  x     / x     / 919 u/L / x     / x      CARDIAC MARKERS ( 2018 11:05 )  x     / x     / 839 u/L / x     / x          Urinalysis Basic - ( 2018 18:31 )    Color: PLYEL / Appearance: CLEAR / S.011 / pH: 6.0  Gluc: NEGATIVE / Ketone: NEGATIVE  / Bili: NEGATIVE / Urobili: NORMAL mg/dL   Blood: NEGATIVE / Protein: NEGATIVE mg/dL / Nitrite: NEGATIVE   Leuk Esterase: NEGATIVE / RBC: 0-2 / WBC 2-5   Sq Epi: x / Non Sq Epi: x / Bacteria: FEW        RADIOLOGY & ADDITIONAL TESTS:    Imaging Personally Reviewed:    Consultant(s) Notes Reviewed:      Care Discussed with Consultants/Other Providers: NP Patient is a 27y old  Male who presents with a chief complaint of     SUBJECTIVE / OVERNIGHT EVENTS:  Patient has no new complaints. Patient concerned of swelling in ankle. Numbness of toes improving. Denies cp, SOB, abdominal pain, N/V/D     MEDICATIONS  (STANDING):  sodium chloride 0.9%. 1000 milliLiter(s) (125 mL/Hr) IV Continuous <Continuous>    MEDICATIONS  (PRN):  acetaminophen   Tablet. 650 milliGRAM(s) Oral every 6 hours PRN Mild Pain (1 - 3)  HYDROmorphone   Tablet 2 milliGRAM(s) Oral every 6 hours PRN Moderate Pain (4 - 6)  HYDROmorphone  Injectable 0.5 milliGRAM(s) IV Push every 6 hours PRN Severe Pain (7 - 10)  LORazepam   Injectable 2 milliGRAM(s) IV Push every 4 hours PRN seizure like activity      Vital Signs Last 24 Hrs  T(C): 36.6 (2018 05:26), Max: 37.3 (2018 14:24)  T(F): 97.9 (2018 05:26), Max: 99.2 (2018 14:24)  HR: 94 (2018 05:26) (75 - 94)  BP: 137/70 (2018 05:26) (133/61 - 147/89)  BP(mean): --  RR: 17 (2018 05:26) (16 - 18)  SpO2: 99% (2018 05:26) (97% - 99%)  CAPILLARY BLOOD GLUCOSE        I&O's Summary    2018 07:  -  2018 07:00  --------------------------------------------------------  IN: 2375 mL / OUT: 2750 mL / NET: -375 mL    2018 07:  -  2018 08:47  --------------------------------------------------------  IN: 125 mL / OUT: 0 mL / NET: 125 mL        PHYSICAL EXAM:  GENERAL: NAD, well-developed  HEAD:  Atraumatic, Normocephalic  EYES: EOMI, PERRLA, conjunctiva and sclera clear  NECK: Supple, No JVD  CHEST/LUNG: Clear to auscultation bilaterally; No wheeze  HEART: Regular rate and rhythm; No murmurs, rubs, or gallops  ABDOMEN: Soft, Nontender, Nondistended; Bowel sounds present  EXTREMITIES:  1+ Left ankle swelling. 2+ Peripheral Pulses, No clubbing, or cyanosis  PSYCH: AAOx3  NEUROLOGY: non-focal  SKIN: No rashes or lesions    LABS:                        12.0   7.00  )-----------( 205      ( 2018 07:28 )             37.4     07-04    145  |  109<H>  |  7   ----------------------------<  90  3.8   |  28  |  1.09    Ca    8.4      2018 07:42  Phos  3.0     07-04  Mg     1.9     07-04    TPro  6.1  /  Alb  3.3  /  TBili  0.3  /  DBili  x   /  AST  33  /  ALT  24  /  AlkPhos  53  07-04      CARDIAC MARKERS ( 2018 07:42 )  x     / x     / 570 u/L / x     / x      CARDIAC MARKERS ( 2018 07:45 )  x     / x     / 874 u/L / x     / x      CARDIAC MARKERS ( 2018 23:15 )  x     / x     / 1024 u/L / x     / x      CARDIAC MARKERS ( 2018 18:20 )  x     / x     / 919 u/L / x     / x      CARDIAC MARKERS ( 2018 11:05 )  x     / x     / 839 u/L / x     / x          Urinalysis Basic - ( 2018 18:31 )    Color: PLYEL / Appearance: CLEAR / S.011 / pH: 6.0  Gluc: NEGATIVE / Ketone: NEGATIVE  / Bili: NEGATIVE / Urobili: NORMAL mg/dL   Blood: NEGATIVE / Protein: NEGATIVE mg/dL / Nitrite: NEGATIVE   Leuk Esterase: NEGATIVE / RBC: 0-2 / WBC 2-5   Sq Epi: x / Non Sq Epi: x / Bacteria: FEW        RADIOLOGY & ADDITIONAL TESTS:    Imaging Personally Reviewed:    Consultant(s) Notes Reviewed:      Care Discussed with Consultants/Other Providers: NP

## 2018-07-04 NOTE — PROGRESS NOTE ADULT - PROBLEM SELECTOR PLAN 2
fall precautions, check orthostatics  unclear etiology - pain from ankle injury vs heat stroke vs neuro event, unlikely cardiac, however given report of chest pain/?cramping added trop onto labs, will monitor on tele for now

## 2018-07-04 NOTE — OCCUPATIONAL THERAPY INITIAL EVALUATION ADULT - LIVES WITH, PROFILE
Pt. reports he lives with his grandfather and two uncles in an apartment with 2 steps to enter. Once inside, pt. reports he has an elevator available to reach apartment located on the 5th floor. Per pt., he has a bathtub in his bathroom. Pt.'s explains he also spends a lot of time at his fiances house where there are 4 steps to enter, +steps to reach second floor where bedroom and bathroom are located, +bathtub in bathroom.

## 2018-07-04 NOTE — PHYSICAL THERAPY INITIAL EVALUATION ADULT - MANUAL MUSCLE TESTING RESULTS, REHAB EVAL
Except left ankle NT; Left foot big toe 3/5; 1st to 4th digits: 0-1+/5/no strength deficits were identified

## 2018-07-04 NOTE — PROGRESS NOTE ADULT - ASSESSMENT
27-year-old male with no known past medical history, presenting from home with severe L foot pain after a fall while playing basketball on 7/01, assessed by ortho as a L ankle sprain. Patient also a/w  rhabdomyolysis and LATOYA.

## 2018-07-04 NOTE — OCCUPATIONAL THERAPY INITIAL EVALUATION ADULT - ADDITIONAL COMMENTS
(See continued from above). Xray of Left Ankle on 7/2/18 displayed Soft tissue injury left ankle. CT Head No Contrast on 7/3/18 displayed minimal mucosal thickening involving both ethmoid sphenoid sinuses, otherwise unremarkable noncontrast head CT.

## 2018-07-05 VITALS
RESPIRATION RATE: 18 BRPM | DIASTOLIC BLOOD PRESSURE: 88 MMHG | SYSTOLIC BLOOD PRESSURE: 159 MMHG | TEMPERATURE: 99 F | OXYGEN SATURATION: 95 % | HEART RATE: 71 BPM

## 2018-07-05 LAB
BUN SERPL-MCNC: 10 MG/DL — SIGNIFICANT CHANGE UP (ref 7–23)
CALCIUM SERPL-MCNC: 9.1 MG/DL — SIGNIFICANT CHANGE UP (ref 8.4–10.5)
CHLORIDE SERPL-SCNC: 104 MMOL/L — SIGNIFICANT CHANGE UP (ref 98–107)
CK SERPL-CCNC: 425 U/L — HIGH (ref 30–200)
CO2 SERPL-SCNC: 30 MMOL/L — SIGNIFICANT CHANGE UP (ref 22–31)
CREAT SERPL-MCNC: 1.14 MG/DL — SIGNIFICANT CHANGE UP (ref 0.5–1.3)
GLUCOSE SERPL-MCNC: 93 MG/DL — SIGNIFICANT CHANGE UP (ref 70–99)
HCT VFR BLD CALC: 37.6 % — LOW (ref 39–50)
HGB BLD-MCNC: 12.5 G/DL — LOW (ref 13–17)
MAGNESIUM SERPL-MCNC: 1.9 MG/DL — SIGNIFICANT CHANGE UP (ref 1.6–2.6)
MCHC RBC-ENTMCNC: 27.4 PG — SIGNIFICANT CHANGE UP (ref 27–34)
MCHC RBC-ENTMCNC: 33.2 % — SIGNIFICANT CHANGE UP (ref 32–36)
MCV RBC AUTO: 82.3 FL — SIGNIFICANT CHANGE UP (ref 80–100)
NRBC # FLD: 0 — SIGNIFICANT CHANGE UP
PLATELET # BLD AUTO: 217 K/UL — SIGNIFICANT CHANGE UP (ref 150–400)
PMV BLD: 10.8 FL — SIGNIFICANT CHANGE UP (ref 7–13)
POTASSIUM SERPL-MCNC: 3.8 MMOL/L — SIGNIFICANT CHANGE UP (ref 3.5–5.3)
POTASSIUM SERPL-SCNC: 3.8 MMOL/L — SIGNIFICANT CHANGE UP (ref 3.5–5.3)
RBC # BLD: 4.57 M/UL — SIGNIFICANT CHANGE UP (ref 4.2–5.8)
RBC # FLD: 12.9 % — SIGNIFICANT CHANGE UP (ref 10.3–14.5)
SODIUM SERPL-SCNC: 141 MMOL/L — SIGNIFICANT CHANGE UP (ref 135–145)
WBC # BLD: 6.84 K/UL — SIGNIFICANT CHANGE UP (ref 3.8–10.5)
WBC # FLD AUTO: 6.84 K/UL — SIGNIFICANT CHANGE UP (ref 3.8–10.5)

## 2018-07-05 PROCEDURE — 99239 HOSP IP/OBS DSCHRG MGMT >30: CPT

## 2018-07-05 RX ORDER — ACETAMINOPHEN 500 MG
2 TABLET ORAL
Qty: 0 | Refills: 0 | COMMUNITY
Start: 2018-07-05

## 2018-07-05 NOTE — DISCHARGE NOTE ADULT - CARE PLAN
Principal Discharge DX:	Syncope  Goal:	fall prevention  Assessment and plan of treatment:	your telemetry monitor with no events, follow up with PMD in 2 weeks  Secondary Diagnosis:	Rhabdomyolysis  Goal:	normal lab  Assessment and plan of treatment:	your CK level had downtrend to acceptable range. follow up with your PMD in 2 weeks  Secondary Diagnosis:	Left ankle injury, initial encounter  Goal:	pain control and improve swelling  Assessment and plan of treatment:	weight bearing as tolerates in aircast, pain control, rest, ice, elevation. Follow up with Dr Gonzalez in 1 week. call [phone] for appointments Principal Discharge DX:	Syncope  Goal:	fall prevention  Assessment and plan of treatment:	your telemetry monitor with no events, follow up with PMD in 2 weeks or you can followup at the [facility] clinic [phone], call to make appointment  Secondary Diagnosis:	Rhabdomyolysis  Goal:	normal lab  Assessment and plan of treatment:	your CK level had downtrend to acceptable range. follow up with your PMD in 2 weeks  Secondary Diagnosis:	Left ankle injury, initial encounter  Goal:	pain control and improve swelling  Assessment and plan of treatment:	weight bearing as tolerates in aircast, pain control, rest, ice, elevation. Follow up with Dr Gonzalez in 1 week. call [phone] for appointments Principal Discharge DX:	Syncope  Goal:	fall prevention  Assessment and plan of treatment:	your telemetry monitor with no events, follow up with PMD in 2 weeks or you can followup at the [facility] clinic [phone], call to make appointment  Secondary Diagnosis:	Rhabdomyolysis  Goal:	normal lab  Assessment and plan of treatment:	your CK level had downtrend to acceptable range. follow up with your PMD in 2 weeks  Secondary Diagnosis:	Left ankle injury, initial encounter  Goal:	pain control and improve swelling  Assessment and plan of treatment:	weight bearing as tolerates in aircast, pain control, rest, ice, elevation. Follow up with Dr Gonzalez in 1 week. call [phone] for appointments  Secondary Diagnosis:	LATOYA (acute kidney injury)  Assessment and plan of treatment:	resolved  discussed findings of kidney disease seen on sono; reinforced importance of routine follow up

## 2018-07-05 NOTE — CHART NOTE - NSCHARTNOTEFT_GEN_A_CORE
pt stable for dc  d/w pt that he does have some underlying renal dz and importance of f/u  LATOYA resolved  rhabdo resolved  will give number to [facility] medical [facility]; pt understands  ortho f/u prior to dc for air cast  35 min spent with dc planning

## 2018-07-05 NOTE — DISCHARGE NOTE ADULT - HOSPITAL COURSE
27-year-old male with no known past medical history, presenting from home with severe L foot pain after a fall while playing basketball on 7/01.  He reports hearing a "pop" sound when he fell, landing on the lateral part of the L ankle and then his ankle buckled underneath him.  Patient reports trying to walk home after he fell, kept having intermittent blurry vision, chills, diaphoresis, dyspnea and a chest pain described as a cramping/heartburn pain in the center of his chest that radiated to the left.  He describes intermittent tonic contractures in legs and arms (describes hands as becoming clawed, where he had to try to pry his fingers apart), also in abdomen and chest.  He states he "blacked out" for a few minutes 2x (was already sitting/laying down, no head trauma).  He had intermittent nausea with non-bloody vomitus x2.  On 07/02 AM patient had another syncopal episode, witnessed, without head trauma, prior to EMS transport to hospital.  Patient states he has never had symptoms like this before.  He reports playing basketball indoors, was drinking fluids, no prolonged exposure to heat.  No recent vigorous cardio or weight lifting, routinely works out at a gym, denies supplement use.  Denies drug use    Hospital course:  EKG: NSR at 85 bpm, TWI V1  CE x2: Trop 16-->11-->8, -->919-->1024-->874  WBC: 17.85  BUN/Cr: 30/3.60  Glucose: 110  UA: Negative  DDimer 168  7/2 X-ray left ankle: Soft tissue injury left ankle.  7/3 CT head: Minimal mucosal thickening involving both ethmoid sphenoid sinuses, otherwise unremarkable noncontrast head CT.  7/4 renal U/S: Echogenic kidneys consistent with renal parenchymal disease. No hydronephrosis.    CE negative with normal EKG, Telemetry noted sinus tachy in setting of pain otherwise no events. Low suspicion of ACS. Orthopedic consulted for left ankle pain noted soft tissue injury recommended WBAT in air cast with outpt followup. Pt with elevated Cr and CK in setting of tissue injury s/p IVF with improvement. PT recommended outpt PT. 27-year-old male with no known past medical history, presenting from home with severe L foot pain after a fall while playing basketball on 7/01.  He reports hearing a "pop" sound when he fell, landing on the lateral part of the L ankle and then his ankle buckled underneath him.  Patient reports trying to walk home after he fell, kept having intermittent blurry vision, chills, diaphoresis, dyspnea and a chest pain described as a cramping/heartburn pain in the center of his chest that radiated to the left.  He describes intermittent tonic contractures in legs and arms (describes hands as becoming clawed, where he had to try to pry his fingers apart), also in abdomen and chest.  He states he "blacked out" for a few minutes 2x (was already sitting/laying down, no head trauma).  He had intermittent nausea with non-bloody vomitus x2.  On 07/02 AM patient had another syncopal episode, witnessed, without head trauma, prior to EMS transport to hospital.  Patient states he has never had symptoms like this before.  He reports playing basketball indoors, was drinking fluids, no prolonged exposure to heat.  No recent vigorous cardio or weight lifting, routinely works out at a gym, denies supplement use.  Denies drug use    Hospital course:  EKG: NSR at 85 bpm, TWI V1  CE x2: Trop 16-->11-->8, -->919-->1024-->874  WBC: 17.85  BUN/Cr: 30/3.60  Glucose: 110  UA: Negative  DDimer 168  7/2 X-ray left ankle: Soft tissue injury left ankle.  7/3 CT head: Minimal mucosal thickening involving both ethmoid sphenoid sinuses, otherwise unremarkable noncontrast head CT.  7/4 renal U/S: Echogenic kidneys consistent with renal parenchymal disease. No hydronephrosis.    CE negative with normal EKG, Telemetry noted sinus tachy in setting of pain otherwise no events. Low suspicion of ACS. Orthopedic consulted for left ankle pain noted soft tissue injury recommended WBAT in air cast with outpt followup. Pt with elevated Cr and CK in setting of tissue injury s/p IVF with improvement. PT recommended outpt PT. As per Dr Fritz pt cleared for discharge on 7/5 27-year-old male with no known past medical history, presenting from home with severe L foot pain after a fall while playing basketball on 7/01.  He reports hearing a "pop" sound when he fell, landing on the lateral part of the L ankle and then his ankle buckled underneath him.  Patient reports trying to walk home after he fell, kept having intermittent blurry vision, chills, diaphoresis, dyspnea and a chest pain described as a cramping/heartburn pain in the center of his chest that radiated to the left.  He describes intermittent tonic contractures in legs and arms (describes hands as becoming clawed, where he had to try to pry his fingers apart), also in abdomen and chest.  He states he "blacked out" for a few minutes 2x (was already sitting/laying down, no head trauma).  He had intermittent nausea with non-bloody vomitus x2.  On 07/02 AM patient had another syncopal episode, witnessed, without head trauma, prior to EMS transport to hospital.  Patient states he has never had symptoms like this before.  He reports playing basketball indoors, was drinking fluids, no prolonged exposure to heat.  No recent vigorous cardio or weight lifting, routinely works out at a gym, denies supplement use.  Denies drug use    Hospital course:  EKG: NSR at 85 bpm, TWI V1  CE x2: Trop 16-->11-->8, -->919-->1024-->874  WBC: 17.85  BUN/Cr: 30/3.60  Glucose: 110  UA: Negative  DDimer 168  7/2 X-ray left ankle: Soft tissue injury left ankle.  7/3 CT head: Minimal mucosal thickening involving both ethmoid sphenoid sinuses, otherwise unremarkable noncontrast head CT.  7/4 renal U/S: Echogenic kidneys consistent with renal parenchymal disease. No hydronephrosis.    CE negative with normal EKG, Telemetry noted sinus tachy in setting of pain otherwise no events. Low suspicion of ACS. Orthopedic consulted for left ankle pain noted soft tissue injury recommended WBAT in air cast with outpt followup. Pt with elevated Cr and CK in setting of tissue injury s/p IVF with improvement. PT recommended outpt PT. As per Dr Fritz pt stable for discharge on 7/5

## 2018-07-05 NOTE — DISCHARGE NOTE ADULT - MEDICATION SUMMARY - MEDICATIONS TO TAKE
I will START or STAY ON the medications listed below when I get home from the hospital:    outpatient Physical therapy  -- Outpatient physical therapy     twice a week as needed  -- Indication: For outpatient PT    acetaminophen 325 mg oral tablet  -- 2 tab(s) by mouth every 6 hours, As needed, Mild Pain (1 - 3)  -- Indication: For pain    Percocet 5/325 oral tablet  -- 1 tab(s) by mouth every 6 hours, As Needed MDD:4 tablets  -- Indication: For pain

## 2018-07-05 NOTE — DISCHARGE NOTE ADULT - PATIENT PORTAL LINK FT
You can access the [facility] Patient Portal, offered by [facility], by registering with the following website: [email or web address]

## 2018-07-05 NOTE — DISCHARGE NOTE ADULT - PLAN OF CARE
fall prevention your telemetry monitor with no events, follow up with PMD in 2 weeks normal lab your CK level had downtrend to acceptable range. follow up with your PMD in 2 weeks pain control and improve swelling weight bearing as tolerates in aircast, pain control, rest, ice, elevation. Follow up with Dr Gonzalez in 1 week. call [phone] for appointments your telemetry monitor with no events, follow up with PMD in 2 weeks or you can followup at the [facility] [phone], call to make appointment resolved  discussed findings of kidney disease seen on sono; reinforced importance of routine follow up

## 2020-04-01 ENCOUNTER — EMERGENCY (EMERGENCY)
Facility: HOSPITAL | Age: 30
LOS: 1 days | Discharge: HOME | End: 2020-04-01
Admitting: EMERGENCY MEDICINE
Payer: MEDICAID

## 2020-04-01 ENCOUNTER — OUTPATIENT (OUTPATIENT)
Dept: OUTPATIENT SERVICES | Facility: HOSPITAL | Age: 30
LOS: 1 days | End: 2020-04-01

## 2020-04-01 VITALS
SYSTOLIC BLOOD PRESSURE: 162 MMHG | TEMPERATURE: 98 F | RESPIRATION RATE: 18 BRPM | DIASTOLIC BLOOD PRESSURE: 95 MMHG | OXYGEN SATURATION: 100 % | HEART RATE: 89 BPM

## 2020-04-01 PROBLEM — S09.90XA UNSPECIFIED INJURY OF HEAD, INITIAL ENCOUNTER: Chronic | Status: ACTIVE | Noted: 2018-07-03

## 2020-04-01 PROCEDURE — 99283 EMERGENCY DEPT VISIT LOW MDM: CPT

## 2020-04-01 PROCEDURE — 72125 CT NECK SPINE W/O DYE: CPT | Mod: 26

## 2020-04-01 RX ORDER — KETOROLAC TROMETHAMINE 30 MG/ML
30 SYRINGE (ML) INJECTION ONCE
Refills: 0 | Status: DISCONTINUED | OUTPATIENT
Start: 2020-04-01 | End: 2020-04-01

## 2020-04-01 RX ORDER — CYCLOBENZAPRINE HYDROCHLORIDE 10 MG/1
1 TABLET, FILM COATED ORAL
Qty: 21 | Refills: 0
Start: 2020-04-01 | End: 2020-04-07

## 2020-04-01 RX ORDER — CYCLOBENZAPRINE HYDROCHLORIDE 10 MG/1
10 TABLET, FILM COATED ORAL ONCE
Refills: 0 | Status: COMPLETED | OUTPATIENT
Start: 2020-04-01 | End: 2020-04-01

## 2020-04-01 RX ADMIN — Medication 30 MILLIGRAM(S): at 15:54

## 2020-04-01 RX ADMIN — CYCLOBENZAPRINE HYDROCHLORIDE 10 MILLIGRAM(S): 10 TABLET, FILM COATED ORAL at 15:54

## 2020-04-01 NOTE — ED ADULT TRIAGE NOTE - CHIEF COMPLAINT QUOTE
c/o sudden neck pain after a jerked movement while getting ut of bed. denies sensory deficit, denies medical hx, Soft collar in place, reports placed y family significant other.

## 2020-04-01 NOTE — ED PROVIDER NOTE - OBJECTIVE STATEMENT
29 y.o male with no PMHx presents to the ED complaining of having left sided neck pain since this morning. Pt states that he woke this morning and felt a pop on the left side of his neck as he was turning his head to the right. Pt states that he is still able to turn his head however states that he has occasional tightness in his neck. Pt denies having any numbness or tingling in the neck area. Pt denies having any nausea, vomiting, diarrhea, fever, chills, bodyaches, dizziness, abdominal pain.

## 2020-04-01 NOTE — ED ADULT TRIAGE NOTE - SOURCE OF INFORMATION
Colonoscopy Diverticulosis    The pathology results demonstrated Hyperplastic.    Recall 5 yrs optional due to age    Increase fiber Yes Patient

## 2020-04-01 NOTE — ED PROVIDER NOTE - MUSCULOSKELETAL NECK EXAM
LIMITED ROM/no deformity, pain or tenderness. no restriction of movement/improvement/PAIN ON MOVEMENT/SPASMS

## 2020-04-01 NOTE — ED PROVIDER NOTE - CLINICAL SUMMARY MEDICAL DECISION MAKING FREE TEXT BOX
29 y.o male with no PMHx presents to the ED complaining of having left sided neck pain since this morning.. neck pain-ct, medication, reassess

## 2020-04-01 NOTE — ED ADULT NURSE NOTE - OBJECTIVE STATEMENT
Pt awoke this am and stated he turned his neck and felt a pop after that is was extremely painful to turn his neck

## 2020-04-01 NOTE — ED PROVIDER NOTE - NSFOLLOWUPINSTRUCTIONS_ED_ALL_ED_FT
Rest, drink plenty of fluids.  Advance activity as tolerated.  Continue all previously prescribed medications as directed.  Follow up with your primary care physician in 48-72 hours- bring copies of your results.  Return to the ER for worsening or persistent symptoms, and/or ANY NEW OR CONCERNING SYMPTOMS. If you have issues obtaining follow up, please call: [phone] to obtain a doctor or specialist who takes your insurance in your area.  You may call [phone] to make an appointment with the internal medicine clinic.

## 2020-04-01 NOTE — ED PROVIDER NOTE - PATIENT PORTAL LINK FT
You can access the FollowMyHealth Patient Portal offered by [facility] by registering at the following website: [email or web address]. By joining [employer]’s FollowMyHealth portal, you will also be able to view your health information using other applications (apps) compatible with our system.

## 2020-04-07 DIAGNOSIS — Z71.89 OTHER SPECIFIED COUNSELING: ICD-10-CM

## 2021-07-27 NOTE — ED ADULT TRIAGE NOTE - CHIEF COMPLAINT QUOTE
"Subjective   Yves Acevedo is a 31 y.o. male.       History of Present Illness   Patient reports a history of multiple previous nasal and sinus surgeries.  Says he had a \"cyst\" removed from his sinuses in 2014.  (Notes are not available for this procedure but it sounds like he had maxillary antrostomy with removal of retention cyst).  Subsequently in 2016 underwent septorhinoplasty including nasal valve surgery and turbinate surgery by Dr. Verma.  Those notes are available in epic and personally reviewed.  Patient states he continues to have recurring sinus infections with purulent rhinorrhea.  He also states he has headaches and states his sinuses trigger his migraines which he describes as \"mini seizures\" because of the associated aura and dissociation.  Uses Flonase or one of the other nasal steroid on a daily basis.  Also states he alternates Zyrtec and Claritin-D.  Previously used a [place] pot but says it irritates his nose.  He has had 4 sinus infections in the last 2 months and has been on multiple rounds of antibiotics.  Was seen in the emergency department on 7/14/2021.  Had a CT scan of the sinuses done that is personally reviewed.  This shows marked mucosal thickening in both maxillary sinuses with evidence of previous maxillary antrostomies.  Has significant mucosal disease in the anterior ethmoids and to a lesser extent the posterior ethmoids.  Has significant ethmoid air cell extension into the frontal sinus with evidence of mucosal thickening of the left greater than the right frontal sinuses.    The following portions of the patient's history were reviewed and updated as appropriate: allergies, current medications, past family history, past medical history, past social history, past surgical history and problem list.     reports that he has never smoked. He has never used smokeless tobacco. He reports current alcohol use of about 5.0 standard drinks of alcohol per week. He reports current " Pt was playing basketball yesterday and injured his left ankle. Today presents with increased pain in ankle, lethargy and dehydration. drug use. Drug: Marijuana.   Patient is not a tobacco user and has not been counseled for use of tobacco products      Review of Systems        Objective   Physical Exam  Ears: External ears no deformity, canals no discharge, tympanic membranes intact clear and mobile bilaterally.  Nares: Boggy mucosa, no discharge seen on anterior rhinoscopy.  Septum appears to be in midline  Oral cavity: Lips and gums without lesions.  Tongue and floor of mouth without lesions.  Parotid and submandibular ducts unobstructed.  No mucosal lesions on the buccal mucosa or vestibule of the mouth.  Pharynx: No erythema exudate or mass  Neck: No lymphadenopathy.  No thyromegaly.  Trachea and larynx midline.  No masses in the parotid or submandibular glands.  Nasal endoscopy is performed: Baltazar-Synephrine and Xylocaine are instilled the nares bilaterally.  0° scope is passed into each nostril.  The inferior, middle, and superior turbinates as well as nasal septum and nasopharynx are examined.  Pertinent findings include: Enlargement and some edema of the left middle turbinate such that I cannot visualize the surgical antrostomy but I also do not see any evidence of pus or polyp at this moment.  On the right side I can see the posterior aspect of his maxillary antrostomy and there is no evidence of pus or polyp.  No intranasal polyps medial to the turbinates and no mass in the nasopharynx.      Assessment/Plan   Diagnoses and all orders for this visit:    1. Chronic pansinusitis (Primary)      Plan: Given his multiple previous surgeries as well as the extension of the ethmoid air cells well into the frontal sinuses I do not believe this is something that is within the scope of my practice to address surgically.  He does not appear to have evidence of acute infection so we will not add additional antibiotic therapy at this point but have him continue nasal steroids and I have recommended a consultation with Dr. Parker (or one of his rhinology  colleagues) at [facility] for his opinion and management at his discretion.  Told the patient that if he undergoes surgery that I would be willing to provide local follow-up at the discretion of his surgeon.  I will see him again as needed.

## 2021-12-26 ENCOUNTER — EMERGENCY (EMERGENCY)
Facility: HOSPITAL | Age: 31
LOS: 1 days | Discharge: HOME | End: 2021-12-26
Admitting: EMERGENCY MEDICINE
Payer: MEDICAID

## 2021-12-26 VITALS
HEART RATE: 99 BPM | DIASTOLIC BLOOD PRESSURE: 93 MMHG | TEMPERATURE: 99 F | HEIGHT: 70 IN | SYSTOLIC BLOOD PRESSURE: 151 MMHG | OXYGEN SATURATION: 96 % | RESPIRATION RATE: 18 BRPM

## 2021-12-26 PROCEDURE — 99284 EMERGENCY DEPT VISIT MOD MDM: CPT

## 2021-12-26 RX ORDER — IBUPROFEN 200 MG
600 TABLET ORAL ONCE
Refills: 0 | Status: COMPLETED | OUTPATIENT
Start: 2021-12-26 | End: 2021-12-26

## 2021-12-26 RX ORDER — ACETAMINOPHEN 500 MG
650 TABLET ORAL ONCE
Refills: 0 | Status: COMPLETED | OUTPATIENT
Start: 2021-12-26 | End: 2021-12-26

## 2021-12-26 RX ADMIN — Medication 650 MILLIGRAM(S): at 20:10

## 2021-12-26 RX ADMIN — Medication 600 MILLIGRAM(S): at 20:10

## 2021-12-26 NOTE — ED PROVIDER NOTE - NSFOLLOWUPINSTRUCTIONS_ED_ALL_ED_FT
Follow up with your Primary Medical Doctor.  Rest.  Drink plenty of fluids.  Take Tylenol 650mg orally every 6 hours as needed for fever.  Self quarantine as discussed based on CDC guidelines.  It is very important to be diligent about hand washing & hygiene to avoid spreading the illness to others.  Return to the ER for any persistent/worsening or new symptoms chest pain, shortness of breath, unable to eat/drink high fever or any concerning symptoms.  See attached COVID pamphlet.

## 2021-12-26 NOTE — ED PROVIDER NOTE - PATIENT PORTAL LINK FT
You can access the FollowMyHealth Patient Portal offered by [facility] by registering at the following website: [email or web address]. By joining [facility]’s FollowMyHealth portal, you will also be able to view your health information using other applications (apps) compatible with our system.

## 2021-12-26 NOTE — ED PROVIDER NOTE - OBJECTIVE STATEMENT
29 y/o male with no significant PMHx presents to the ER c/o headache, congestion, fevers, chills, dry cough that started yesterday.  Pt denies chest pain, sob.  Pt did not receive vaccine.

## 2021-12-26 NOTE — ED PROVIDER NOTE - CLINICAL SUMMARY MEDICAL DECISION MAKING FREE TEXT BOX
29 y/o male with no significant PMHx presents to the ER c/o headache, congestion, fevers, chills, dry cough that started yesterday.  Pt is well appearing, NAD, lungs CTA, likely viral/COVID, supportive care, COVID swab

## 2021-12-27 LAB — SARS-COV-2 RNA SPEC QL NAA+PROBE: DETECTED

## 2024-03-29 ENCOUNTER — EMERGENCY (EMERGENCY)
Facility: HOSPITAL | Age: 34
LOS: 1 days | Discharge: HOME | End: 2024-03-29
Attending: EMERGENCY MEDICINE | Admitting: EMERGENCY MEDICINE
Payer: MEDICAID

## 2024-03-29 VITALS
SYSTOLIC BLOOD PRESSURE: 135 MMHG | TEMPERATURE: 100 F | RESPIRATION RATE: 18 BRPM | HEART RATE: 102 BPM | OXYGEN SATURATION: 96 % | DIASTOLIC BLOOD PRESSURE: 82 MMHG

## 2024-03-29 VITALS
SYSTOLIC BLOOD PRESSURE: 123 MMHG | RESPIRATION RATE: 18 BRPM | TEMPERATURE: 99 F | HEART RATE: 92 BPM | DIASTOLIC BLOOD PRESSURE: 89 MMHG | OXYGEN SATURATION: 98 %

## 2024-03-29 LAB
ALBUMIN SERPL ELPH-MCNC: 4.2 G/DL — SIGNIFICANT CHANGE UP (ref 3.3–5)
ALP SERPL-CCNC: 67 U/L — SIGNIFICANT CHANGE UP (ref 40–120)
ALT FLD-CCNC: 16 U/L — SIGNIFICANT CHANGE UP (ref 4–41)
ANION GAP SERPL CALC-SCNC: 11 MMOL/L — SIGNIFICANT CHANGE UP (ref 7–14)
APPEARANCE UR: ABNORMAL
AST SERPL-CCNC: 18 U/L — SIGNIFICANT CHANGE UP (ref 4–40)
BACTERIA # UR AUTO: NEGATIVE /HPF — SIGNIFICANT CHANGE UP
BASOPHILS # BLD AUTO: 0.02 K/UL — SIGNIFICANT CHANGE UP (ref 0–0.2)
BASOPHILS NFR BLD AUTO: 0.3 % — SIGNIFICANT CHANGE UP (ref 0–2)
BILIRUB SERPL-MCNC: 1.2 MG/DL — SIGNIFICANT CHANGE UP (ref 0.2–1.2)
BILIRUB UR-MCNC: NEGATIVE — SIGNIFICANT CHANGE UP
BUN SERPL-MCNC: 13 MG/DL — SIGNIFICANT CHANGE UP (ref 7–23)
CALCIUM SERPL-MCNC: 9.2 MG/DL — SIGNIFICANT CHANGE UP (ref 8.4–10.5)
CAST: 1 /LPF — SIGNIFICANT CHANGE UP (ref 0–4)
CHLORIDE SERPL-SCNC: 98 MMOL/L — SIGNIFICANT CHANGE UP (ref 98–107)
CO2 SERPL-SCNC: 28 MMOL/L — SIGNIFICANT CHANGE UP (ref 22–31)
COLOR SPEC: YELLOW — SIGNIFICANT CHANGE UP
CREAT SERPL-MCNC: 1.15 MG/DL — SIGNIFICANT CHANGE UP (ref 0.5–1.3)
D DIMER BLD IA.RAPID-MCNC: 180 NG/ML DDU — SIGNIFICANT CHANGE UP
DIFF PNL FLD: NEGATIVE — SIGNIFICANT CHANGE UP
EGFR: 86 ML/MIN/1.73M2 — SIGNIFICANT CHANGE UP
EOSINOPHIL # BLD AUTO: 0 K/UL — SIGNIFICANT CHANGE UP (ref 0–0.5)
EOSINOPHIL NFR BLD AUTO: 0 % — SIGNIFICANT CHANGE UP (ref 0–6)
FLUAV AG NPH QL: SIGNIFICANT CHANGE UP
FLUBV AG NPH QL: SIGNIFICANT CHANGE UP
GLUCOSE SERPL-MCNC: 111 MG/DL — HIGH (ref 70–99)
GLUCOSE UR QL: NEGATIVE MG/DL — SIGNIFICANT CHANGE UP
HCT VFR BLD CALC: 44.9 % — SIGNIFICANT CHANGE UP (ref 39–50)
HGB BLD-MCNC: 14.5 G/DL — SIGNIFICANT CHANGE UP (ref 13–17)
IANC: 5.11 K/UL — SIGNIFICANT CHANGE UP (ref 1.8–7.4)
IMM GRANULOCYTES NFR BLD AUTO: 1 % — HIGH (ref 0–0.9)
KETONES UR-MCNC: NEGATIVE MG/DL — SIGNIFICANT CHANGE UP
LEUKOCYTE ESTERASE UR-ACNC: ABNORMAL
LIDOCAIN IGE QN: 13 U/L — SIGNIFICANT CHANGE UP (ref 7–60)
LYMPHOCYTES # BLD AUTO: 0.82 K/UL — LOW (ref 1–3.3)
LYMPHOCYTES # BLD AUTO: 11.9 % — LOW (ref 13–44)
MCHC RBC-ENTMCNC: 26.9 PG — LOW (ref 27–34)
MCHC RBC-ENTMCNC: 32.3 GM/DL — SIGNIFICANT CHANGE UP (ref 32–36)
MCV RBC AUTO: 83.1 FL — SIGNIFICANT CHANGE UP (ref 80–100)
MONOCYTES # BLD AUTO: 0.86 K/UL — SIGNIFICANT CHANGE UP (ref 0–0.9)
MONOCYTES NFR BLD AUTO: 12.5 % — SIGNIFICANT CHANGE UP (ref 2–14)
NEUTROPHILS # BLD AUTO: 5.11 K/UL — SIGNIFICANT CHANGE UP (ref 1.8–7.4)
NEUTROPHILS NFR BLD AUTO: 74.3 % — SIGNIFICANT CHANGE UP (ref 43–77)
NITRITE UR-MCNC: NEGATIVE — SIGNIFICANT CHANGE UP
NRBC # BLD: 0 /100 WBCS — SIGNIFICANT CHANGE UP (ref 0–0)
NRBC # FLD: 0 K/UL — SIGNIFICANT CHANGE UP (ref 0–0)
NT-PROBNP SERPL-SCNC: <36 PG/ML — SIGNIFICANT CHANGE UP
PH UR: 6.5 — SIGNIFICANT CHANGE UP (ref 5–8)
PLATELET # BLD AUTO: 243 K/UL — SIGNIFICANT CHANGE UP (ref 150–400)
POTASSIUM SERPL-MCNC: 3.4 MMOL/L — LOW (ref 3.5–5.3)
POTASSIUM SERPL-SCNC: 3.4 MMOL/L — LOW (ref 3.5–5.3)
PROT SERPL-MCNC: 7.5 G/DL — SIGNIFICANT CHANGE UP (ref 6–8.3)
PROT UR-MCNC: SIGNIFICANT CHANGE UP MG/DL
RBC # BLD: 5.4 M/UL — SIGNIFICANT CHANGE UP (ref 4.2–5.8)
RBC # FLD: 13.7 % — SIGNIFICANT CHANGE UP (ref 10.3–14.5)
RBC CASTS # UR COMP ASSIST: 2 /HPF — SIGNIFICANT CHANGE UP (ref 0–4)
REVIEW: SIGNIFICANT CHANGE UP
RSV RNA NPH QL NAA+NON-PROBE: SIGNIFICANT CHANGE UP
SARS-COV-2 RNA SPEC QL NAA+PROBE: SIGNIFICANT CHANGE UP
SODIUM SERPL-SCNC: 137 MMOL/L — SIGNIFICANT CHANGE UP (ref 135–145)
SP GR SPEC: 1.01 — SIGNIFICANT CHANGE UP (ref 1–1.03)
SQUAMOUS # UR AUTO: 3 /HPF — SIGNIFICANT CHANGE UP (ref 0–5)
TROPONIN T, HIGH SENSITIVITY RESULT: 8 NG/L — SIGNIFICANT CHANGE UP
TROPONIN T, HIGH SENSITIVITY RESULT: 8 NG/L — SIGNIFICANT CHANGE UP
TSH SERPL-MCNC: 0.19 UIU/ML — LOW (ref 0.27–4.2)
UROBILINOGEN FLD QL: 1 MG/DL — SIGNIFICANT CHANGE UP (ref 0.2–1)
WBC # BLD: 6.88 K/UL — SIGNIFICANT CHANGE UP (ref 3.8–10.5)
WBC # FLD AUTO: 6.88 K/UL — SIGNIFICANT CHANGE UP (ref 3.8–10.5)
WBC UR QL: 4 /HPF — SIGNIFICANT CHANGE UP (ref 0–5)

## 2024-03-29 PROCEDURE — 99285 EMERGENCY DEPT VISIT HI MDM: CPT

## 2024-03-29 PROCEDURE — 71046 X-RAY EXAM CHEST 2 VIEWS: CPT | Mod: 26,59

## 2024-03-29 PROCEDURE — 93010 ELECTROCARDIOGRAM REPORT: CPT

## 2024-03-29 RX ORDER — ACETAMINOPHEN 500 MG
1000 TABLET ORAL ONCE
Refills: 0 | Status: COMPLETED | OUTPATIENT
Start: 2024-03-29 | End: 2024-03-29

## 2024-03-29 RX ORDER — SODIUM CHLORIDE 9 MG/ML
1000 INJECTION INTRAMUSCULAR; INTRAVENOUS; SUBCUTANEOUS ONCE
Refills: 0 | Status: COMPLETED | OUTPATIENT
Start: 2024-03-29 | End: 2024-03-29

## 2024-03-29 RX ADMIN — Medication 400 MILLIGRAM(S): at 19:14

## 2024-03-29 RX ADMIN — SODIUM CHLORIDE 1000 MILLILITER(S): 9 INJECTION INTRAMUSCULAR; INTRAVENOUS; SUBCUTANEOUS at 19:14

## 2024-03-29 NOTE — ED PROVIDER NOTE - CLINICAL SUMMARY MEDICAL DECISION MAKING FREE TEXT BOX
33-year-old male no significant past medical history presents for chest pain, shortness of breath, chills, headache, nausea, vomiting starting 1 day ago.  Slightly tachycardic, low-grade temp.  EKG sinus tachycardia low 100s, no ischemic change.  Given constellation of symptoms, suspect viral syndrome.  Low concern for meningitis as patient without photophobia or nuchal rigidity.  Low concern for subarachnoid hemorrhage as headache nonacute onset, not maximal in onset.  Low concern for acute coronary syndrome as chest pain not exertional, EKG nonischemic.  Low pretest probability of PE, however unable to apply PERC due to tachycardia.  Will send labs, chest x-ray, supportive care.  Disposition pending.

## 2024-03-29 NOTE — ED PROVIDER NOTE - NSFOLLOWUPINSTRUCTIONS_ED_ALL_ED_FT
[facility] General Internal Medicine  General Internal Medicine  [address] [place] [address]  Phone: [phone]  Fax:     [facility]  Internal Medicine  [address] [place] [address]  Phone: [phone]  Fax: [phone]    [facility]  Cardiology  [address] [place] [address]  Phone: [phone]    Chest Pain  WHAT YOU NEED TO KNOW:  Chest pain can be caused by a range of conditions, from not serious to life-threatening. Chest pain can be a symptom of a digestive problem, such as acid reflux or a stomach ulcer. An anxiety attack or a strong emotion, such as anger, can also cause chest pain. Infection, inflammation, or a fracture in the bones or cartilage in your chest can cause pain or discomfort. Sometimes chest pain or pressure is caused by poor blood flow to your heart (angina). Chest pain may also be caused by life-threatening conditions such as a heart attack or blood clot in your lungs.   DISCHARGE INSTRUCTIONS:  Call 911 if:   •You have any of the following signs of a heart attack: ?Squeezing, pressure, or pain in your chest  ?You may also have any of the following: ?Discomfort or pain in your back, neck, jaw, stomach, or arm  ?Shortness of breath  ?Nausea or vomiting  ?Lightheadedness or a sudden cold sweat  Seek care immediately if:   •You have chest discomfort that gets worse, even with medicine.  •You cough or vomit blood.   •Your bowel movements are black or bloody.   •You cannot stop vomiting, or it hurts to swallow.   Contact your healthcare provider if:   •You have questions or concerns about your condition or care.  Medicines:   •Medicines may be given to treat the cause of your chest pain. Examples include pain medicine, anxiety medicine, or medicines to increase blood flow to your heart.   •Do not take certain medicines without asking your healthcare provider first. These include NSAIDs, herbal or vitamin supplements, or hormones (estrogen or progestin).   •Take your medicine as directed. Contact your healthcare provider if you think your medicine is not helping or if you have side effects. Tell him or her if you are allergic to any medicine. Keep a list of the medicines, vitamins, and herbs you take. Include the amounts, and when and why you take them. Bring the list or the pill bottles to follow-up visits. Carry your medicine list with you in case of an emergency.  Follow up with your healthcare provider within 72 hours, or as directed: You may need to return for more tests to find the cause of your chest pain. You may be referred to a specialist, such as a cardiologist or gastroenterologist. Write down your questions so you remember to ask them during your visits.   Healthy living tips: The following are general healthy guidelines. If your chest pain is caused by a heart problem, your healthcare provider will give you specific guidelines to follow.  •Do not smoke. Nicotine and other chemicals in cigarettes and cigars can cause lung and heart damage. Ask your healthcare provider for information if you currently smoke and need help to quit. E-cigarettes or smokeless tobacco still contain nicotine. Talk to your healthcare provider before you use these products.   •Eat a variety of healthy, low-fat, low-salt foods. Healthy foods include fruits, vegetables, whole-grain breads, low-fat dairy products, beans, lean meats, and fish. Ask for more information about a heart healthy diet.  •Drink plenty of water every day. Your body is made of mostly water. Water helps your body to control temperature and blood pressure. Ask your healthcare provider how much water you should drink every day.  •Ask about activity. Your healthcare provider will tell you which activities to limit or avoid. Ask when you can drive, return to work, and have sex. Ask about the best exercise plan for you.  •Maintain a healthy weight. Ask your healthcare provider how much you should weigh. Ask him or her to help you create a weight loss plan if you are overweight.   If you have a stent:   •Carry your stent card with you at all times.   •Let all healthcare providers know that you have a stent.     [facility] Internal Medicine  General Internal Medicine  [address] [place] [address]  Phone: [phone]  Fax:     [facility]  Internal Medicine  [address] [place] [address]  Phone: [phone]  Fax: [phone]    [facility]  Cardiology  [address] [place] [address]  Phone: [phone]    Chest Pain  WHAT YOU NEED TO KNOW:  Chest pain can be caused by a range of conditions, from not serious to life-threatening. Chest pain can be a symptom of a digestive problem, such as acid reflux or a stomach ulcer. An anxiety attack or a strong emotion, such as anger, can also cause chest pain. Infection, inflammation, or a fracture in the bones or cartilage in your chest can cause pain or discomfort. Sometimes chest pain or pressure is caused by poor blood flow to your heart (angina). Chest pain may also be caused by life-threatening conditions such as a heart attack or blood clot in your lungs.   DISCHARGE INSTRUCTIONS:  Call 911 if:   •You have any of the following signs of a heart attack: ?Squeezing, pressure, or pain in your chest  ?You may also have any of the following: ?Discomfort or pain in your back, neck, jaw, stomach, or arm  ?Shortness of breath  ?Nausea or vomiting  ?Lightheadedness or a sudden cold sweat  Seek care immediately if:   •You have chest discomfort that gets worse, even with medicine.  •You cough or vomit blood.   •Your bowel movements are black or bloody.   •You cannot stop vomiting, or it hurts to swallow.   Contact your healthcare provider if:   •You have questions or concerns about your condition or care.  Medicines:   •Medicines may be given to treat the cause of your chest pain. Examples include pain medicine, anxiety medicine, or medicines to increase blood flow to your heart.   •Do not take certain medicines without asking your healthcare provider first. These include NSAIDs, herbal or vitamin supplements, or hormones (estrogen or progestin).   •Take your medicine as directed. Contact your healthcare provider if you think your medicine is not helping or if you have side effects. Tell him or her if you are allergic to any medicine. Keep a list of the medicines, vitamins, and herbs you take. Include the amounts, and when and why you take them. Bring the list or the pill bottles to follow-up visits. Carry your medicine list with you in case of an emergency.  Follow up with your healthcare provider within 72 hours, or as directed: You may need to return for more tests to find the cause of your chest pain. You may be referred to a specialist, such as a cardiologist or gastroenterologist. Write down your questions so you remember to ask them during your visits.   Healthy living tips: The following are general healthy guidelines. If your chest pain is caused by a heart problem, your healthcare provider will give you specific guidelines to follow.  •Do not smoke. Nicotine and other chemicals in cigarettes and cigars can cause lung and heart damage. Ask your healthcare provider for information if you currently smoke and need help to quit. E-cigarettes or smokeless tobacco still contain nicotine. Talk to your healthcare provider before you use these products.   •Eat a variety of healthy, low-fat, low-salt foods. Healthy foods include fruits, vegetables, whole-grain breads, low-fat dairy products, beans, lean meats, and fish. Ask for more information about a heart healthy diet.  •Drink plenty of water every day. Your body is made of mostly water. Water helps your body to control temperature and blood pressure. Ask your healthcare provider how much water you should drink every day.  •Ask about activity. Your healthcare provider will tell you which activities to limit or avoid. Ask when you can drive, return to work, and have sex. Ask about the best exercise plan for you.  •Maintain a healthy weight. Ask your healthcare provider how much you should weigh. Ask him or her to help you create a weight loss plan if you are overweight.   If you have a stent:   •Carry your stent card with you at all times.   •Let all healthcare providers know that you have a stent.

## 2024-03-29 NOTE — ED ADULT NURSE NOTE - OBJECTIVE STATEMENT
Pt received to intake presents with dizziness and chest pain, fever x few days. Pt a&ox3, ambulatory at baseline, skin intact, respirations even and unlabored, abd soft and non-distended, nontender to palpation. 20g iv placed in rt arm, labs drawn and sent, medicated as per ordered, will continue to monitor.

## 2024-03-29 NOTE — ED PROVIDER NOTE - PATIENT PORTAL LINK FT
You can access the FollowMyHealth Patient Portal offered by [facility] by registering at the following website: [email or web address]. By joining [facility]’s FollowMyHealth portal, you will also be able to view your health information using other applications (apps) compatible with our system.

## 2024-03-29 NOTE — ED PROVIDER NOTE - OBJECTIVE STATEMENT
33-year-old male no significant past medical history presents for chest pain, shortness of breath, chills, headache, nausea, vomiting starting 1 day ago.  Patient states that shortness of breath is positional, exertional, relieved by rest.  Headache nonacute onset, not maximal in onset, no photophobia or neck stiffness.  Emesis is nonbloody and nonbilious.  Chest pain is pleuritic, radiating to left arm, nonexertional, intermittent, no aggravating or alleviating factors.  Patient denies recent travel or sick contacts.  Non-smoker, denies alcohol or illicit drug use.  States his sister had a stroke in her 20s.  No family history of early CAD.  Denies visual changes, speech changes, numbness, tingling, weakness, abdominal pain, diarrhea, bloody or dark stool, dysuria, rash, hemoptysis.

## 2024-03-29 NOTE — ED ADULT NURSE NOTE - NSFALLUNIVINTERV_ED_ALL_ED
Bed/Stretcher in lowest position, wheels locked, appropriate side rails in place/Call bell, personal items and telephone in reach/Instruct patient to call for assistance before getting out of bed/chair/stretcher/Non-slip footwear applied when patient is off stretcher/[place] to call system/Physically safe environment - no spills, clutter or unnecessary equipment/Purposeful proactive rounding/Room/bathroom lighting operational, light cord in reach

## 2024-03-29 NOTE — ED PROVIDER NOTE - PHYSICAL EXAMINATION
GEN:  Non-toxic appearing, non-distressed, speaking full sentences, non-diaphoretic, AAOx3  HEENT:  NCAT, neck supple, EOMI, PERRLA, sclera anicteric, no conjunctival pallor or injection, no stridor, normal voice, no tonsillar exudate, uvula midline  CV:  regular rhythm and rate, s1/s2 audible, no murmurs, rubs or gallops, peripheral pulses 2+ and symmetric  PULM:  non-labored respirations, lungs clear to auscultation bilaterally, no wheezes, crackles or rales  ABD:  non distended, non-tender, no rebound, no guarding, negative Ramirez's sign, bowel sounds normal, no cvat  MSK:  no gross deformity, non-tender extremities and joints, range of motion grossly normal appearing, no extremity edema, extremities warm and well perfused   NEURO:  AAOx3, CN II-XII intact, motor 5/5 in upper and lower extremities bilaterally, sensation grossly intact in extremities and trunk, finger to nose testing wnl, no nystagmus, negative Romberg, no pronator drift, no gait deficit  SKIN:  warm, dry, no rash or vesicles

## 2024-03-29 NOTE — ED ADULT TRIAGE NOTE - CHIEF COMPLAINT QUOTE
Patient has c/o chest pain yesterday that made him dizzy and lightheaded as well as fever that has continued today. Patient states he did not get any sleep last night. Patient also c/o throbbing headache.

## 2024-03-29 NOTE — ED PROVIDER NOTE - PROGRESS NOTE DETAILS
HELENA:  Labs non-actionable.  I independently reviewed the patient's chest x-ray.  There is no pneumothorax, infiltrate, or edema.  Patient feels improved.  Feels safe for discharge.  Return precautions given.  Heart score less than 4.

## 2024-04-04 LAB
CULTURE RESULTS: SIGNIFICANT CHANGE UP
CULTURE RESULTS: SIGNIFICANT CHANGE UP
SPECIMEN SOURCE: SIGNIFICANT CHANGE UP
SPECIMEN SOURCE: SIGNIFICANT CHANGE UP

## 2025-06-18 NOTE — ED CDU PROVIDER INITIAL DAY NOTE - INDICATION FOR OBSERVATION
Therapeutic Intensity
Detail Level: Detailed
Quality 226: Preventive Care And Screening: Tobacco Use: Screening And Cessation Intervention: Patient screened for tobacco use and is an ex/non-smoker